# Patient Record
Sex: MALE | Race: OTHER | NOT HISPANIC OR LATINO | Employment: OTHER | URBAN - NONMETROPOLITAN AREA
[De-identification: names, ages, dates, MRNs, and addresses within clinical notes are randomized per-mention and may not be internally consistent; named-entity substitution may affect disease eponyms.]

---

## 2021-03-04 PROBLEM — Z95.0 PRESENCE OF CARDIAC PACEMAKER: Status: ACTIVE | Noted: 2021-03-04

## 2021-03-04 PROBLEM — I10 ESSENTIAL HYPERTENSION: Status: ACTIVE | Noted: 2021-03-04

## 2021-03-04 PROBLEM — I47.10 SVT (SUPRAVENTRICULAR TACHYCARDIA): Status: ACTIVE | Noted: 2021-03-04

## 2021-03-04 PROBLEM — I48.0 PAROXYSMAL A-FIB (HCC): Status: ACTIVE | Noted: 2021-03-04

## 2021-03-04 PROBLEM — I47.1 SVT (SUPRAVENTRICULAR TACHYCARDIA) (HCC): Status: ACTIVE | Noted: 2021-03-04

## 2021-03-04 PROBLEM — I49.5 SSS (SICK SINUS SYNDROME): Status: ACTIVE | Noted: 2021-03-04

## 2021-03-04 RX ORDER — LANOLIN ALCOHOL/MO/W.PET/CERES
1000 CREAM (GRAM) TOPICAL DAILY
COMMUNITY

## 2021-03-04 RX ORDER — ATORVASTATIN CALCIUM 10 MG/1
20 TABLET, FILM COATED ORAL DAILY
Status: ON HOLD | COMMUNITY
End: 2021-04-16

## 2021-03-04 RX ORDER — CYCLOSPORINE 0.5 MG/ML
1 EMULSION OPHTHALMIC 2 TIMES DAILY
COMMUNITY

## 2021-03-04 RX ORDER — NITROGLYCERIN 0.4 MG/1
0.4 TABLET SUBLINGUAL
COMMUNITY

## 2021-03-04 RX ORDER — MELATONIN
1000 DAILY
COMMUNITY

## 2021-03-04 RX ORDER — ZINC GLUCONATE 50 MG
50 TABLET ORAL DAILY
Status: ON HOLD | COMMUNITY
End: 2021-04-16

## 2021-03-04 RX ORDER — MULTIVIT-MIN/IRON/FOLIC ACID/K 18-600-40
2000 CAPSULE ORAL DAILY
COMMUNITY
End: 2021-07-02

## 2021-03-04 RX ORDER — IBUPROFEN 800 MG/1
800 TABLET ORAL EVERY 8 HOURS PRN
Status: ON HOLD | COMMUNITY
End: 2022-05-09

## 2021-03-04 RX ORDER — METOPROLOL SUCCINATE 50 MG/1
50 TABLET, EXTENDED RELEASE ORAL NIGHTLY
COMMUNITY
End: 2021-04-17 | Stop reason: HOSPADM

## 2021-03-04 RX ORDER — MULTIPLE VITAMINS W/ MINERALS TAB 9MG-400MCG
1 TAB ORAL DAILY
COMMUNITY

## 2021-03-04 RX ORDER — BENZALKONIUM CHLORIDE 1.3 MG/ML
CLOTH TOPICAL
COMMUNITY
End: 2022-04-06

## 2021-03-05 ENCOUNTER — CONSULT (OUTPATIENT)
Dept: CARDIOLOGY | Facility: CLINIC | Age: 76
End: 2021-03-05

## 2021-03-05 VITALS
HEIGHT: 65 IN | BODY MASS INDEX: 36.65 KG/M2 | WEIGHT: 220 LBS | DIASTOLIC BLOOD PRESSURE: 72 MMHG | SYSTOLIC BLOOD PRESSURE: 112 MMHG | HEART RATE: 80 BPM

## 2021-03-05 DIAGNOSIS — I49.5 SICK SINUS SYNDROME (HCC): ICD-10-CM

## 2021-03-05 DIAGNOSIS — I48.19 ATRIAL FIBRILLATION, PERSISTENT (HCC): Primary | ICD-10-CM

## 2021-03-05 DIAGNOSIS — I47.1 PAROXYSMAL SVT (SUPRAVENTRICULAR TACHYCARDIA) (HCC): ICD-10-CM

## 2021-03-05 PROCEDURE — 99204 OFFICE O/P NEW MOD 45 MIN: CPT | Performed by: INTERNAL MEDICINE

## 2021-03-05 NOTE — PROGRESS NOTES
Electrophysiology Clinic Consult     Benoit Pham  1945  [unfilled]  [unfilled]    03/05/21    DATE OF ADMISSION: (Not on file)  [facility] CARDIOLOGY    Jin Rahman MD  [address] [facility] [address] / [place] [address]  Referring Provider: Gabino Olvera     Chief Complaint   Patient presents with   • Atrial Fibrillation       Problem List:  1. Persistent Atrial Fibrillation   a. CHADSvasc = 2  b. Echocardiogram 7/19/2013: EF 60-65%, no significant valve abnormalities   c. LHC 11/11/2013: minimal luminal irregularities involving RCA and LAD, normal EF   d. Treated with beta blocker   2. Sick Sinus Syndrome  a. Dual Chamber PPM 7/2013  3. SVT  a. S/p RFA AVnRT in [place] in 2011  4. Tobacco use - resolved  5. DJD  6. GERD  7. Hiatal Hernia  8. TIANA - on CPAP  9. Asthma/COPD  10. Anxiety/Depression - with previous suicide attempt  11. BPH  12. DJD  13. Diverticulosis  14. Surgical History:  a. Tonsillectomy  b. Right total hip replacement     History of Present Illness:     75 year old WM with above stated past medical history, who presents today in consultation, referred by Dr. Avery Olvera for further evaluation and management of his atrial fibrillation. Over the past year, the patient has had increasing more frequent episodes of symptomatic atrial fibrillation, up to 60% burden on his last pacemaker interrogation in 2/2021. His symptoms include fatigue, worsening shortness of breath with exertion, lightheadedness, near-syncope, and poor exercise tolerance.  He is unaware of palpitations.  His symptoms are moderate to severe in intensity.  They occur constantly throughout the day and do not seem to be relieved by any special maneuvers.  His symptoms are definitely worsened with exertional activities.  He does not know if he is in atrial fibrillation today.  He has had no antiarrhythmic therapy or attempts for cardioversion recently.  Interrogations of his device demonstrated  that he has been in persistent atrial fibrillation for at least the past few months.  He has been on Xarelto for consider amount of times.  When he does rarely check his blood pressure at home, he states that they are reasonably well controlled.  His heart rates have generally been running in the 70 to 80 bpm range.  He denies any TIAs or CVAs.  Denies any obstructive sleep apnea symptoms although the patient is overweight.  He has had no significant bleeding on Xarelto.    Allergies   Allergen Reactions   • Penicillins Hives   • Nuts Mental Status Change     WALNUTS - BECOMES VERY SLEEPY        Cannot display prior to admission medications because the patient has not been admitted in this contact.            Current Outpatient Medications:   •  atorvastatin (LIPITOR) 10 MG tablet, Take 10 mg by mouth Daily., Disp: , Rfl:   •  Cholecalciferol (Vitamin D) 50 MCG (2000 UT) capsule, Take 2,000 Units by mouth Daily., Disp: , Rfl:   •  cholecalciferol (VITAMIN D3) 25 MCG (1000 UT) tablet, Take 1,000 Units by mouth Daily., Disp: , Rfl:   •  cycloSPORINE (RESTASIS) 0.05 % ophthalmic emulsion, Administer 1 drop to both eyes 2 (Two) Times a Day., Disp: , Rfl:   •  ibuprofen (ADVIL,MOTRIN) 800 MG tablet, Take 800 mg by mouth Every 8 (Eight) Hours As Needed., Disp: , Rfl:   •  Magnesium 100 MG tablet, Take 100 mg by mouth 3 (Three) Times a Day., Disp: , Rfl:   •  metoprolol succinate XL (TOPROL-XL) 50 MG 24 hr tablet, Take 50 mg by mouth Daily., Disp: , Rfl:   •  Misc Natural Products (GLUCOS-CHONDROIT-MSM COMPLEX PO), Take 1 tablet by mouth Daily., Disp: , Rfl:   •  multivitamin with minerals tablet tablet, Take 1 tablet by mouth Daily., Disp: , Rfl:   •  nitroglycerin (NITROSTAT) 0.4 MG SL tablet, Place 0.4 mg under the tongue Every 5 (Five) Minutes As Needed. Take no more than 3 doses in 15 minutes., Disp: , Rfl:   •  Respiratory Therapy Supplies (CareTouch CPAP & BIPAP Hose) misc, , Disp: , Rfl:   •  rivaroxaban (XARELTO)  "20 MG tablet, Take 20 mg by mouth Daily., Disp: , Rfl:   •  vitamin B-12 (CYANOCOBALAMIN) 1000 MCG tablet, Take 1,000 mcg by mouth Daily., Disp: , Rfl:   •  vitamin E 200 UNIT capsule, Take 200 Units by mouth Daily., Disp: , Rfl:   •  Zinc 50 MG tablet, Take 50 mg by mouth Daily., Disp: , Rfl:     Social History     Socioeconomic History   • Marital status: Single     Spouse name: Not on file   • Number of children: Not on file   • Years of education: Not on file   • Highest education level: Not on file     Family history: There is a history of hypertension and CAD although not premature.  No history of atrial fibrillation that he is aware of.    Social history: The patient is a retired  from [place].  He is single.  He denies any recreational drug use, tobacco abuse, or substantial alcohol use.  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      REVIEW OF SYSTEMS:   CONST:  No weight loss, fever, chills, weakness + fatigue.   HEENT:  No visual loss, blurred vision, double vision, yellow sclerae.                   No hearing loss, congestion, sore throat.   SKIN:      No rashes, urticaria, ulcers, sores.     RESP:     +shortness of breath, No hemoptysis, cough, sputum.   GI:           No anorexia, nausea, vomiting, diarrhea. + abdominal pain, No melena.   :         No burning on urination, hematuria or increased frequency.  ENDO:    No diaphoresis, cold or heat intolerance. No polyuria or polydipsia.   NEURO:  No headache, dizziness, syncope, paralysis, ataxia, or parasthesias.                  No change in bowel or bladder control. No history of CVA/TIA  MUSC:    No muscle, back pain, joint pain or stiffness.   HEME:    No anemia, bleeding, bruising. No history of DVT/PE.  PSYCH:  + history of depression, anxiety    Vitals:    03/05/21 1051   BP: 112/72   BP Location: Left arm   Patient Position: Sitting   Pulse: 80   Weight: 99.8 kg (220 lb)   Height: 165.1 cm (65\")           "       Physical Exam:  GEN: Well nourished, well-developed, no acute distress  HEENT: Normocephalic, atraumatic, PERRLA, moist mucous membranes  NECK: Supple, NO JVD, no thyromegaly, no lymphadenopathy  CARD: S1S2, RRR, no murmur, gallop, rub, PMI not appreciated  LUNGS: Clear to auscultation, normal respiratory effort  ABDOMEN: Soft, nontender, normal bowel sounds + obesity  EXTREMITIES: No gross deformities, no clubbing, cyanosis, or edema  SKIN: Warm, dry  NEURO: No focal deficits, alert and oriented x 3  PSYCHIATRIC: Normal affect and mood      I personally viewed and interpreted the patient's EKG/Telemetry/lab data    No results found for: GLUCOSE, CALCIUM, NA, K, CO2, CL, BUN, CREATININE, EGFRIFAFRI, EGFRIFNONA, BCR, ANIONGAP  No results found for: WBC, HGB, HCT, MCV, PLT  No results found for: INR, PROTIME  No results found for: TSH, S8GOMGX, Y6JJRBG, THYROIDAB          ECG 12 Lead    Date/Time: 3/5/2021 11:34 AM  Performed by: Varun Reyna MD  Authorized by: Varun Reyna MD   Comparison: not compared with previous ECG   Previous ECG: no previous ECG available  Rhythm: atrial fibrillation and paced  BPM: 80            Pacemaker interrogation demonstrates normal function.  He is in atrial fibrillation 100% of time since last interrogation.  Paced 70% in the ventricle.  No substantial RVR noted.  It looks like he has been in atrial fibrillation since December 20, 2020.        ICD-10-CM ICD-9-CM   1. Atrial fibrillation, persistent (CMS/[facility])  I48.19 427.31   2. Sick sinus syndrome (CMS/[facility])  I49.5 427.81   3. Paroxysmal SVT (supraventricular tachycardia) (CMS/[facility])  I47.1 427.0       Assessment and Plan:    1.  Symptomatic persistent atrial fibrillation with adequate rate control by interrogation of his pacemaker today.  At this point, options include external cardioversion with antiarrhythmic therapy versus pulmonary vein ablation.  After long discussion with the patient, he would like to pursue  external cardioversion antiarrhythmic therapy.  The patient will need an echocardiogram to assess LV size and function as well as left atrial size and to help determine which antiarrhythmic medication needs to be started.  He does understand all the risk and benefits of external cardioversion and conscious sedation as well as antiarrhythmic therapy.    2. Anticoagulation: Will Vasc score 2.  Continue Xarelto.    Thank you for the interesting consult.  Will notify you of the upcoming results of external cardioversion and antiarrhythmic therapy.

## 2021-03-08 ENCOUNTER — E-VISIT (OUTPATIENT)
Dept: FAMILY MEDICINE CLINIC | Facility: TELEHEALTH | Age: 76
End: 2021-03-08

## 2021-03-08 NOTE — PROGRESS NOTES
I counseled the patient to follow up with ED.  Patient is in Afib and has cardiologist that he is trying to message via [facility]. I will send secure chat message to the provider.

## 2021-03-10 ENCOUNTER — PREP FOR SURGERY (OUTPATIENT)
Dept: OTHER | Facility: HOSPITAL | Age: 76
End: 2021-03-10

## 2021-03-10 DIAGNOSIS — I48.19 ATRIAL FIBRILLATION, PERSISTENT (HCC): Primary | ICD-10-CM

## 2021-03-10 RX ORDER — SODIUM CHLORIDE 0.9 % (FLUSH) 0.9 %
10 SYRINGE (ML) INJECTION AS NEEDED
Status: CANCELLED | OUTPATIENT
Start: 2021-03-10

## 2021-03-10 RX ORDER — SODIUM CHLORIDE 0.9 % (FLUSH) 0.9 %
3 SYRINGE (ML) INJECTION EVERY 12 HOURS SCHEDULED
Status: CANCELLED | OUTPATIENT
Start: 2021-03-10

## 2021-03-10 RX ORDER — ACETAMINOPHEN 325 MG/1
650 TABLET ORAL EVERY 4 HOURS PRN
Status: CANCELLED | OUTPATIENT
Start: 2021-03-10

## 2021-03-10 RX ORDER — SODIUM CHLORIDE 9 MG/ML
1 INJECTION, SOLUTION INTRAVENOUS CONTINUOUS
Status: CANCELLED | OUTPATIENT
Start: 2021-03-10 | End: 2021-03-10

## 2021-03-16 ENCOUNTER — APPOINTMENT (OUTPATIENT)
Dept: CARDIOLOGY | Facility: HOSPITAL | Age: 76
End: 2021-03-16

## 2021-03-16 ENCOUNTER — HOSPITAL ENCOUNTER (OUTPATIENT)
Dept: CARDIOLOGY | Facility: HOSPITAL | Age: 76
Discharge: HOME | End: 2021-03-16
Attending: INTERNAL MEDICINE | Admitting: INTERNAL MEDICINE

## 2021-03-16 VITALS
TEMPERATURE: 97.2 F | OXYGEN SATURATION: 98 % | DIASTOLIC BLOOD PRESSURE: 82 MMHG | BODY MASS INDEX: 38.98 KG/M2 | WEIGHT: 220 LBS | HEIGHT: 63 IN | RESPIRATION RATE: 16 BRPM | SYSTOLIC BLOOD PRESSURE: 114 MMHG | HEART RATE: 75 BPM

## 2021-03-16 DIAGNOSIS — I48.0 PAROXYSMAL A-FIB (HCC): ICD-10-CM

## 2021-03-16 LAB
ANION GAP SERPL CALCULATED.3IONS-SCNC: 9 MMOL/L (ref 5–15)
BH CV ECHO MEAS - EDV(MOD-SP2): 85 ML
BH CV ECHO MEAS - EDV(MOD-SP4): 106 ML
BH CV ECHO MEAS - EF(MOD-BP): 42 %
BH CV ECHO MEAS - EF(MOD-SP2): 41.2 %
BH CV ECHO MEAS - EF(MOD-SP4): 46.2 %
BH CV ECHO MEAS - ESV(MOD-SP2): 50 ML
BH CV ECHO MEAS - ESV(MOD-SP4): 57 ML
BH CV ECHO MEAS - LVLD AP2: 6.9 CM
BH CV ECHO MEAS - LVLD AP4: 7.3 CM
BH CV ECHO MEAS - LVLS AP2: 6.2 CM
BH CV ECHO MEAS - LVLS AP4: 7.1 CM
BH CV ECHO MEAS - SV(MOD-SP2): 35 ML
BH CV ECHO MEAS - SV(MOD-SP4): 49 ML
BH CV VAS BP LEFT ARM: NORMAL MMHG
BUN SERPL-MCNC: 17 MG/DL (ref 8–23)
BUN/CREAT SERPL: 23.9 (ref 7–25)
CALCIUM SPEC-SCNC: 9.4 MG/DL (ref 8.6–10.5)
CHLORIDE SERPL-SCNC: 104 MMOL/L (ref 98–107)
CO2 SERPL-SCNC: 28 MMOL/L (ref 22–29)
CREAT SERPL-MCNC: 0.71 MG/DL (ref 0.76–1.27)
DEPRECATED RDW RBC AUTO: 46.8 FL (ref 37–54)
ERYTHROCYTE [DISTWIDTH] IN BLOOD BY AUTOMATED COUNT: 12.5 % (ref 12.3–15.4)
GFR SERPL CREATININE-BSD FRML MDRD: 108 ML/MIN/1.73
GFR SERPL CREATININE-BSD FRML MDRD: 131 ML/MIN/1.73
GLUCOSE SERPL-MCNC: 96 MG/DL (ref 65–99)
HBA1C MFR BLD: 4.7 % (ref 4.8–5.6)
HCT VFR BLD AUTO: 39.9 % (ref 37.5–51)
HGB BLD-MCNC: 13.8 G/DL (ref 13–17.7)
INR PPP: 1.81 (ref 0.85–1.16)
LV EF 2D ECHO EST: 42 %
MAXIMAL PREDICTED HEART RATE: 144 BPM
MCH RBC QN AUTO: 35.3 PG (ref 26.6–33)
MCHC RBC AUTO-ENTMCNC: 34.6 G/DL (ref 31.5–35.7)
MCV RBC AUTO: 102 FL (ref 79–97)
PLATELET # BLD AUTO: 235 10*3/MM3 (ref 140–450)
PMV BLD AUTO: 10 FL (ref 6–12)
POTASSIUM SERPL-SCNC: 4.7 MMOL/L (ref 3.5–5.2)
PROTHROMBIN TIME: 20.6 SECONDS (ref 11.5–14)
RBC # BLD AUTO: 3.91 10*6/MM3 (ref 4.14–5.8)
SODIUM SERPL-SCNC: 141 MMOL/L (ref 136–145)
STRESS TARGET HR: 122 BPM
WBC # BLD AUTO: 6.99 10*3/MM3 (ref 3.4–10.8)

## 2021-03-16 PROCEDURE — 80048 BASIC METABOLIC PNL TOTAL CA: CPT | Performed by: INTERNAL MEDICINE

## 2021-03-16 PROCEDURE — 93308 TTE F-UP OR LMTD: CPT

## 2021-03-16 PROCEDURE — 25010000002 MIDAZOLAM PER 1 MG: Performed by: INTERNAL MEDICINE

## 2021-03-16 PROCEDURE — 93308 TTE F-UP OR LMTD: CPT | Performed by: INTERNAL MEDICINE

## 2021-03-16 PROCEDURE — 93321 DOPPLER ECHO F-UP/LMTD STD: CPT

## 2021-03-16 PROCEDURE — 85027 COMPLETE CBC AUTOMATED: CPT | Performed by: INTERNAL MEDICINE

## 2021-03-16 PROCEDURE — 93321 DOPPLER ECHO F-UP/LMTD STD: CPT | Performed by: INTERNAL MEDICINE

## 2021-03-16 PROCEDURE — 92960 CARDIOVERSION ELECTRIC EXT: CPT

## 2021-03-16 PROCEDURE — 93325 DOPPLER ECHO COLOR FLOW MAPG: CPT

## 2021-03-16 PROCEDURE — 83036 HEMOGLOBIN GLYCOSYLATED A1C: CPT | Performed by: PHYSICIAN ASSISTANT

## 2021-03-16 PROCEDURE — 93325 DOPPLER ECHO COLOR FLOW MAPG: CPT | Performed by: INTERNAL MEDICINE

## 2021-03-16 PROCEDURE — 92960 CARDIOVERSION ELECTRIC EXT: CPT | Performed by: INTERNAL MEDICINE

## 2021-03-16 PROCEDURE — 85610 PROTHROMBIN TIME: CPT | Performed by: INTERNAL MEDICINE

## 2021-03-16 RX ORDER — SODIUM CHLORIDE 9 MG/ML
1 INJECTION, SOLUTION INTRAVENOUS CONTINUOUS
Status: ACTIVE | OUTPATIENT
Start: 2021-03-16 | End: 2021-03-16

## 2021-03-16 RX ORDER — SODIUM CHLORIDE 0.9 % (FLUSH) 0.9 %
3 SYRINGE (ML) INJECTION EVERY 12 HOURS SCHEDULED
Status: DISCONTINUED | OUTPATIENT
Start: 2021-03-16 | End: 2021-03-16 | Stop reason: HOSPADM

## 2021-03-16 RX ORDER — MIDAZOLAM HYDROCHLORIDE 1 MG/ML
INJECTION INTRAMUSCULAR; INTRAVENOUS
Status: COMPLETED | OUTPATIENT
Start: 2021-03-16 | End: 2021-03-16

## 2021-03-16 RX ORDER — SODIUM PHOSPHATE,MONO-DIBASIC 19G-7G/118
1 ENEMA (ML) RECTAL 2 TIMES DAILY WITH MEALS
COMMUNITY

## 2021-03-16 RX ORDER — NALOXONE HYDROCHLORIDE 0.4 MG/ML
INJECTION, SOLUTION INTRAMUSCULAR; INTRAVENOUS; SUBCUTANEOUS
Status: DISCONTINUED
Start: 2021-03-16 | End: 2021-03-16 | Stop reason: WASHOUT

## 2021-03-16 RX ORDER — MIDAZOLAM HYDROCHLORIDE 1 MG/ML
INJECTION INTRAMUSCULAR; INTRAVENOUS
Status: DISCONTINUED
Start: 2021-03-16 | End: 2021-03-16 | Stop reason: HOSPADM

## 2021-03-16 RX ORDER — ACETAMINOPHEN 325 MG/1
650 TABLET ORAL EVERY 4 HOURS PRN
Status: DISCONTINUED | OUTPATIENT
Start: 2021-03-16 | End: 2021-03-16 | Stop reason: HOSPADM

## 2021-03-16 RX ORDER — SODIUM CHLORIDE 0.9 % (FLUSH) 0.9 %
10 SYRINGE (ML) INJECTION AS NEEDED
Status: DISCONTINUED | OUTPATIENT
Start: 2021-03-16 | End: 2021-03-16 | Stop reason: HOSPADM

## 2021-03-16 RX ORDER — FLUMAZENIL 0.1 MG/ML
INJECTION INTRAVENOUS
Status: DISCONTINUED
Start: 2021-03-16 | End: 2021-03-16 | Stop reason: WASHOUT

## 2021-03-16 RX ADMIN — METHOHEXITAL SODIUM 10 MG: 500 INJECTION, POWDER, LYOPHILIZED, FOR SOLUTION INTRAMUSCULAR; INTRAVENOUS; RECTAL at 12:17

## 2021-03-16 RX ADMIN — METHOHEXITAL SODIUM 10 MG: 500 INJECTION, POWDER, LYOPHILIZED, FOR SOLUTION INTRAMUSCULAR; INTRAVENOUS; RECTAL at 12:18

## 2021-03-16 RX ADMIN — METHOHEXITAL SODIUM 20 MG: 500 INJECTION, POWDER, LYOPHILIZED, FOR SOLUTION INTRAMUSCULAR; INTRAVENOUS; RECTAL at 12:16

## 2021-03-16 RX ADMIN — MIDAZOLAM 1 MG: 1 INJECTION INTRAMUSCULAR; INTRAVENOUS at 12:16

## 2021-03-16 NOTE — H&P
Cardiology H&P     Benoit Pham  1945  [phone]  There is no work phone number on file.    03/16/21    DATE OF ADMISSION: 3/16/2021  [facility] CECI Rahman, Jin Daniel MD  [address] [address] / [place] [address]    Referring: Dr Olvera   CC:  Afib    Problem List:  Persistent Atrial Fibrillation   CHADSvasc = 2  Echocardiogram 7/19/2013: EF 60-65%, no significant valve abnormalities   LHC 11/11/2013: minimal luminal irregularities involving RCA and LAD, normal EF   Treated with beta blocker   Sick Sinus Syndrome  Dual Chamber [employer]  PPM 7/2013  SVT  S/p RFA AVnRT in [place] in 2011  Tobacco use - resolved  DJD  GERD  Hiatal Hernia  TIANA - on CPAP  Asthma/COPD  Anxiety/Depression - with previous suicide attempt  BPH  DJD  Diverticulosis  Surgical History:  Tonsillectomy  Right total hip replacement      History of Present Illness: Mr. Huertas is a pleasant 75-year-old white male referred by Dr. Avery Olvera for further evaluation and management of persistent, symptomatic atrial fibrillation and [employer] pacemaker.  He had an increasing atrial fibrillation burden documented on recent pacemaker interrogations.  He has symptoms of atrial fibrillation including fatigue, worsening shortness of breath with exertion, lightheadedness, near syncope.  He states the symptoms are moderate to severe in intensity.  They occur every on a daily basis.  There are no specific alleviating factors he has been able to identify.  He is not able to complete all the daily tasks and exercise that he let would like to.  He has never undergone cardioversion or taking antiarrhythmic therapy in the past.  Prior treatment for A. fib included pacemaker placement and management with beta-blockers.  he has taken Xarelto with no bleeding problems.  No missed doses.  No recent hospitalizations, emergency room visits, medication changes or health events except since he saw Dr. Reyna in clinic  3/5/2021.       Allergies   Allergen Reactions    Penicillins Hives    Nuts Mental Status Change     WALNUTS - BECOMES VERY SLEEPY       Prior to Admission Medications       Prescriptions Last Dose Informant Patient Reported? Taking?    atorvastatin (LIPITOR) 10 MG tablet   Yes No    Take 10 mg by mouth Daily.    Cholecalciferol (Vitamin D) 50 MCG (2000 UT) capsule   Yes No    Take 2,000 Units by mouth Daily.    cholecalciferol (VITAMIN D3) 25 MCG (1000 UT) tablet   Yes No    Take 1,000 Units by mouth Daily.    cycloSPORINE (RESTASIS) 0.05 % ophthalmic emulsion   Yes No    Administer 1 drop to both eyes 2 (Two) Times a Day.    ibuprofen (ADVIL,MOTRIN) 800 MG tablet   Yes No    Take 800 mg by mouth Every 8 (Eight) Hours As Needed.    Magnesium 100 MG tablet   Yes No    Take 100 mg by mouth 3 (Three) Times a Day.    metoprolol succinate XL (TOPROL-XL) 50 MG 24 hr tablet   Yes No    Take 50 mg by mouth Daily.    Misc Natural Products (GLUCOS-CHONDROIT-MSM COMPLEX PO)   Yes No    Take 1 tablet by mouth Daily.    multivitamin with minerals tablet tablet   Yes No    Take 1 tablet by mouth Daily.    nitroglycerin (NITROSTAT) 0.4 MG SL tablet   Yes No    Place 0.4 mg under the tongue Every 5 (Five) Minutes As Needed. Take no more than 3 doses in 15 minutes.    Respiratory Therapy Supplies (CareTouch CPAP & BIPAP Hose) misc   Yes No    rivaroxaban (XARELTO) 20 MG tablet   Yes No    Take 20 mg by mouth Daily.    vitamin B-12 (CYANOCOBALAMIN) 1000 MCG tablet   Yes No    Take 1,000 mcg by mouth Daily.    vitamin E 200 UNIT capsule   Yes No    Take 200 Units by mouth Daily.    Zinc 50 MG tablet   Yes No    Take 50 mg by mouth Daily.              Current Facility-Administered Medications:     acetaminophen (TYLENOL) tablet 650 mg, 650 mg, Oral, Q4H PRN, Gabrielle Mustafa PA    methohexital (BREVITAL) 50 MG/5ML injection solution prefilled syringe  - ADS Override Pull, , , ,     midazolam (VERSED) 2 MG/2ML injection  - ADS Override  Pull, , , ,     sodium chloride 0.9 % flush 10 mL, 10 mL, Intravenous, PRN, Gabrielle Mustafa PA    sodium chloride 0.9 % flush 3 mL, 3 mL, Intravenous, Q12H, Gabrielle Mustafa PA    Current Outpatient Medications:     atorvastatin (LIPITOR) 10 MG tablet, Take 10 mg by mouth Daily., Disp: , Rfl:     cholecalciferol (VITAMIN D3) 25 MCG (1000 UT) tablet, Take 1,000 Units by mouth Daily., Disp: , Rfl:     cycloSPORINE (RESTASIS) 0.05 % ophthalmic emulsion, Administer 1 drop to both eyes 2 (Two) Times a Day., Disp: , Rfl:     glucosamine-chondroitin 500-400 MG capsule capsule, Take  by mouth 3 (Three) Times a Day With Meals., Disp: , Rfl:     ibuprofen (ADVIL,MOTRIN) 800 MG tablet, Take 800 mg by mouth Every 8 (Eight) Hours As Needed., Disp: , Rfl:     Magnesium 100 MG tablet, Take 100 mg by mouth 3 (Three) Times a Day., Disp: , Rfl:     metoprolol succinate XL (TOPROL-XL) 50 MG 24 hr tablet, Take 50 mg by mouth Daily., Disp: , Rfl:     multivitamin with minerals tablet tablet, Take 1 tablet by mouth Daily., Disp: , Rfl:     nitroglycerin (NITROSTAT) 0.4 MG SL tablet, Place 0.4 mg under the tongue Every 5 (Five) Minutes As Needed. Take no more than 3 doses in 15 minutes., Disp: , Rfl:     rivaroxaban (XARELTO) 20 MG tablet, Take 20 mg by mouth Daily., Disp: , Rfl:     vitamin B-12 (CYANOCOBALAMIN) 1000 MCG tablet, Take 1,000 mcg by mouth Daily., Disp: , Rfl:     vitamin E 200 UNIT capsule, Take 200 Units by mouth Daily., Disp: , Rfl:     Zinc 50 MG tablet, Take 50 mg by mouth Daily., Disp: , Rfl:     Cholecalciferol (Vitamin D) 50 MCG (2000 UT) capsule, Take 2,000 Units by mouth Daily., Disp: , Rfl:     Misc Natural Products (GLUCOS-CHONDROIT-MSM COMPLEX PO), Take 1 tablet by mouth Daily., Disp: , Rfl:     Respiratory Therapy Supplies (CareTouch CPAP & BIPAP Hose) misc, , Disp: , Rfl:     Social History     Socioeconomic History    Marital status: Single     Spouse name: Not on file    Number of children: Not on file     "Years of education: Not on file    Highest education level: Not on file       No family history on file.    REVIEW OF SYSTEMS:   CONST:  No weight loss, fever, chills, +weakness or +fatigue.   HEENT:  No visual loss, blurred vision, double vision, yellow sclerae.                   No hearing loss, congestion, sore throat.   SKIN:      No rashes, urticaria, ulcers, sores.     RESP:     + shortness of breath, hemoptysis, cough, sputum.   GI:           No anorexia, nausea, vomiting, diarrhea. No abdominal pain, melena.   :         No burning on urination, hematuria or increased frequency.  ENDO:    No diaphoresis, cold or heat intolerance. No polyuria or polydipsia.   NEURO:  No headache, dizziness, syncope, paralysis, ataxia, or parasthesias.                  No change in bowel or bladder control. No history of CVA/TIA  MUSC:    No muscle, back pain, joint pain or stiffness.   HEME:    No anemia, bleeding, bruising. No history of DVT/PE.  PSYCH:  No history of depression, anxiety    Vitals:    03/16/21 1137 03/16/21 1142 03/16/21 1322   BP: 135/85 114/82    BP Location: Right arm Left arm    Patient Position: Lying Lying    Pulse: 75     Resp: 16     Temp: 97.2 °F (36.2 °C)     TempSrc: Tympanic     SpO2: 98%     Weight: 100 kg (220 lb 14.4 oz)  99.8 kg (220 lb)   Height: 165.1 cm (65\")  160 cm (62.99\")         Vital Sign Min/Max for last 24 hours  Temp  Min: 97.2 °F (36.2 °C)  Max: 97.2 °F (36.2 °C)   BP  Min: 114/82  Max: 135/85   Pulse  Min: 75  Max: 75   Resp  Min: 16  Max: 16   SpO2  Min: 98 %  Max: 98 %   Flow (L/min)  Min: 2  Max: 2    No intake or output data in the 24 hours ending 03/16/21 1643          Physical Exam:  GEN: Well nourished, Well- developed  No acute distress  HEENT: Normocephalic, Atraumatic, PERRLA, moist mucous membranes  NECK: supple, NO JVD, no thyromegaly, no lymphadenopathy  CARD: Irregular no murmur, gallop, rub  LUNGS: Clear to auscultataion, normal respiratory effort  ABDOMEN: Soft, " nontender, normal bowel sounds- large abdominal girth   EXTREMITIES:No gross deformities,  No clubbing, cyanosis, or edema  SKIN: Warm, dry, left chest pacemaker site noted  NEURO: No focal deficits  PSYCHIATRIC: Normal affect and mood      I personally viewed and interpreted the patient's EKG/Telemetry/lab data    Data:   Labs: Pending   Results from last 7 days   Lab Units 03/16/21  1138   WBC 10*3/mm3 6.99   HEMOGLOBIN g/dL 13.8   HEMATOCRIT % 39.9   PLATELETS 10*3/mm3 235     Results from last 7 days   Lab Units 03/16/21  1138   SODIUM mmol/L 141   POTASSIUM mmol/L 4.7   CHLORIDE mmol/L 104   CO2 mmol/L 28.0   BUN mg/dL 17   CREATININE mg/dL 0.71*   GLUCOSE mg/dL 96      Results from last 7 days   Lab Units 03/16/21  1138   HEMOGLOBIN A1C % 4.70*             Results from last 7 days   Lab Units 03/16/21  1138   PROTIME Seconds 20.6*   INR  1.81*                 No intake or output data in the 24 hours ending 03/16/21 1643    Chest X-Ray:  Imaging Results (Last 24 Hours)       ** No results found for the last 24 hours. **          COVID Test: Result report from outside facility reviewed: COVID test is negative.  Please see scanned document under media    Telemetry: Atrial fibrillation 75-85bpm     Assessment and Plan:   1) Symptomatic persistent atrial fibrillation/tachycardia-bradycardia syndrome  -Status post [employer] dual-chamber pacemaker  -Patient presents today for external cardioversion.  The risk, benefits, alternatives to external cardioversion have been reviewed with the patient who wishes to proceed  -We will proceed with echocardiogram following cardioversion to reassess LV size and function as well as left atrial size to determine which antiarrhythmic medication needs to be started.    2) Anticoagulation: [id number] score of 2-continue Xarelto, no missed doses    Electronically signed by ALBINO Roman, 03/16/21, 11:27 AM EDT.      I, Varun Reyna MD, personally performed the  services face to face as described and documented by the above named individual. I have made any necessary edits and it is both accurate and complete 3/16/2021  16:43 EDT          Addendum:   Pt had ERAF and continues to be paced 75-85 bpm in Afib. Limited Echo shows EF 42%. Dr. Reyna had a long discussion with the patient and all agree to proceed with PVA in the near future. We will make every effort to arrange the schedule so that he can get in in the next month. He will continue current home meds including Xarelto and Metoprolol.

## 2021-03-25 ENCOUNTER — PREP FOR SURGERY (OUTPATIENT)
Dept: OTHER | Facility: HOSPITAL | Age: 76
End: 2021-03-25

## 2021-03-25 DIAGNOSIS — I48.0 PAROXYSMAL A-FIB (HCC): Primary | ICD-10-CM

## 2021-03-25 RX ORDER — SODIUM CHLORIDE 9 MG/ML
1 INJECTION, SOLUTION INTRAVENOUS CONTINUOUS
Status: CANCELLED | OUTPATIENT
Start: 2021-03-25 | End: 2021-03-25

## 2021-03-25 RX ORDER — SODIUM CHLORIDE 0.9 % (FLUSH) 0.9 %
10 SYRINGE (ML) INJECTION AS NEEDED
Status: CANCELLED | OUTPATIENT
Start: 2021-03-25

## 2021-03-25 RX ORDER — ACETAMINOPHEN 325 MG/1
650 TABLET ORAL EVERY 4 HOURS PRN
Status: CANCELLED | OUTPATIENT
Start: 2021-03-25

## 2021-03-25 RX ORDER — SODIUM CHLORIDE 0.9 % (FLUSH) 0.9 %
3 SYRINGE (ML) INJECTION EVERY 12 HOURS SCHEDULED
Status: CANCELLED | OUTPATIENT
Start: 2021-03-25

## 2021-03-29 ENCOUNTER — APPOINTMENT (OUTPATIENT)
Dept: PREADMISSION TESTING | Facility: HOSPITAL | Age: 76
End: 2021-03-29

## 2021-03-29 ENCOUNTER — HOSPITAL ENCOUNTER (OUTPATIENT)
Dept: CT IMAGING | Facility: HOSPITAL | Age: 76
Discharge: HOME | End: 2021-03-29

## 2021-03-29 DIAGNOSIS — I48.0 PAROXYSMAL A-FIB (HCC): ICD-10-CM

## 2021-03-29 DIAGNOSIS — E66.01 SEVERE OBESITY (BMI 35.0-39.9) WITH COMORBIDITY (HCC): Primary | ICD-10-CM

## 2021-03-29 DIAGNOSIS — I48.19 ATRIAL FIBRILLATION, PERSISTENT (HCC): Primary | ICD-10-CM

## 2021-03-29 LAB
ANION GAP SERPL CALCULATED.3IONS-SCNC: 9 MMOL/L (ref 5–15)
BUN SERPL-MCNC: 22 MG/DL (ref 8–23)
BUN/CREAT SERPL: 32.8 (ref 7–25)
CALCIUM SPEC-SCNC: 8.7 MG/DL (ref 8.6–10.5)
CHLORIDE SERPL-SCNC: 105 MMOL/L (ref 98–107)
CO2 SERPL-SCNC: 27 MMOL/L (ref 22–29)
CREAT SERPL-MCNC: 0.67 MG/DL (ref 0.76–1.27)
DEPRECATED RDW RBC AUTO: 47.4 FL (ref 37–54)
ERYTHROCYTE [DISTWIDTH] IN BLOOD BY AUTOMATED COUNT: 12.5 % (ref 12.3–15.4)
GFR SERPL CREATININE-BSD FRML MDRD: 115 ML/MIN/1.73
GFR SERPL CREATININE-BSD FRML MDRD: 140 ML/MIN/1.73
GLUCOSE SERPL-MCNC: 86 MG/DL (ref 65–99)
HCT VFR BLD AUTO: 40.5 % (ref 37.5–51)
HGB BLD-MCNC: 13.8 G/DL (ref 13–17.7)
MCH RBC QN AUTO: 35.3 PG (ref 26.6–33)
MCHC RBC AUTO-ENTMCNC: 34.1 G/DL (ref 31.5–35.7)
MCV RBC AUTO: 103.6 FL (ref 79–97)
PLATELET # BLD AUTO: 229 10*3/MM3 (ref 140–450)
PMV BLD AUTO: 10.4 FL (ref 6–12)
POTASSIUM SERPL-SCNC: 5 MMOL/L (ref 3.5–5.2)
RBC # BLD AUTO: 3.91 10*6/MM3 (ref 4.14–5.8)
SARS-COV-2 RNA RESP QL NAA+PROBE: DETECTED
SARS-COV-2 RNA RESP QL NAA+PROBE: DETECTED
SODIUM SERPL-SCNC: 141 MMOL/L (ref 136–145)
TSH SERPL DL<=0.05 MIU/L-ACNC: 3.69 UIU/ML (ref 0.27–4.2)
WBC # BLD AUTO: 7.05 10*3/MM3 (ref 3.4–10.8)

## 2021-03-29 PROCEDURE — 36415 COLL VENOUS BLD VENIPUNCTURE: CPT

## 2021-03-29 PROCEDURE — C9803 HOPD COVID-19 SPEC COLLECT: HCPCS

## 2021-03-29 PROCEDURE — 85027 COMPLETE CBC AUTOMATED: CPT

## 2021-03-29 PROCEDURE — U0003 INFECTIOUS AGENT DETECTION BY NUCLEIC ACID (DNA OR RNA); SEVERE ACUTE RESPIRATORY SYNDROME CORONAVIRUS 2 (SARS-COV-2) (CORONAVIRUS DISEASE [COVID-19]), AMPLIFIED PROBE TECHNIQUE, MAKING USE OF HIGH THROUGHPUT TECHNOLOGIES AS DESCRIBED BY CMS-2020-01-R: HCPCS

## 2021-03-29 PROCEDURE — 80048 BASIC METABOLIC PNL TOTAL CA: CPT

## 2021-03-29 PROCEDURE — 84443 ASSAY THYROID STIM HORMONE: CPT

## 2021-03-29 RX ORDER — ACETAMINOPHEN 500 MG
500 TABLET ORAL EVERY 6 HOURS PRN
COMMUNITY

## 2021-03-29 RX ORDER — MULTIVIT-MIN/IRON/FOLIC ACID/K 18-600-40
1 CAPSULE ORAL
Status: ON HOLD | COMMUNITY
End: 2021-04-16

## 2021-04-02 ENCOUNTER — APPOINTMENT (OUTPATIENT)
Dept: PREADMISSION TESTING | Facility: HOSPITAL | Age: 76
End: 2021-04-02

## 2021-04-02 ENCOUNTER — HOSPITAL ENCOUNTER (OUTPATIENT)
Dept: CT IMAGING | Facility: HOSPITAL | Age: 76
End: 2021-04-02

## 2021-04-14 ENCOUNTER — APPOINTMENT (OUTPATIENT)
Dept: PREADMISSION TESTING | Facility: HOSPITAL | Age: 76
End: 2021-04-14

## 2021-04-14 ENCOUNTER — HOSPITAL ENCOUNTER (OUTPATIENT)
Dept: CT IMAGING | Facility: HOSPITAL | Age: 76
Discharge: HOME | End: 2021-04-14

## 2021-04-14 ENCOUNTER — PRE-ADMISSION TESTING (OUTPATIENT)
Dept: PREADMISSION TESTING | Facility: HOSPITAL | Age: 76
End: 2021-04-14

## 2021-04-14 LAB — SARS-COV-2 RNA PNL SPEC NAA+PROBE: NOT DETECTED

## 2021-04-14 PROCEDURE — U0004 COV-19 TEST NON-CDC HGH THRU: HCPCS

## 2021-04-14 PROCEDURE — C9803 HOPD COVID-19 SPEC COLLECT: HCPCS

## 2021-04-14 PROCEDURE — 0 IOPAMIDOL PER 1 ML: Performed by: NURSE PRACTITIONER

## 2021-04-14 PROCEDURE — 71275 CT ANGIOGRAPHY CHEST: CPT

## 2021-04-14 RX ADMIN — IOPAMIDOL 85 ML: 755 INJECTION, SOLUTION INTRAVENOUS at 11:40

## 2021-04-14 NOTE — PAT
Patient presented to covid clinic for covid test.  Patient was positive on 3/29 x two.  Has not been 28 days since positive and repeat test would no be performed due to not greater than 90 days.  Patients PAT appointment was cancelled due to positive covid. Notified CT because patient also has appt today for CT scan.  Called ADORE at Dr. Reyna's office but no answer, left message for Brenda at Dr. Reyna's office to call back regarding patients procedure.

## 2021-04-15 ENCOUNTER — ANESTHESIA EVENT (OUTPATIENT)
Dept: CARDIOLOGY | Facility: HOSPITAL | Age: 76
End: 2021-04-15

## 2021-04-15 ENCOUNTER — PREP FOR SURGERY (OUTPATIENT)
Dept: OTHER | Facility: HOSPITAL | Age: 76
End: 2021-04-15

## 2021-04-15 NOTE — ANESTHESIA PREPROCEDURE EVALUATION
Anesthesia Evaluation                  Airway   Mallampati: II  TM distance: >3 FB  Neck ROM: full  No difficulty expected  Dental - normal exam     Pulmonary - normal exam   (+) a smoker Former, COPD, asthma,sleep apnea on CPAP,   Cardiovascular - normal exam    (+) pacemaker pacemaker, hypertension, dysrhythmias (xarelto tx; last dose 2.5 days ago) Paroxysmal Atrial Fib, hyperlipidemia,     ROS comment:   CT angio chest 4/14/21: no DELMI thrombus    Echo 3/21: EF 42%, mild AR/MR/TR    Neuro/Psych  GI/Hepatic/Renal/Endo    (+)  GERD,      Musculoskeletal     Abdominal  - normal exam    Bowel sounds: normal.   Substance History      OB/GYN          Other   arthritis,                    Anesthesia Plan    ASA 3     general     intravenous induction     Anesthetic plan, all risks, benefits, and alternatives have been provided, discussed and informed consent has been obtained with: patient.    Plan discussed with CRNA.

## 2021-04-16 ENCOUNTER — HOSPITAL ENCOUNTER (OUTPATIENT)
Facility: HOSPITAL | Age: 76
Discharge: HOME | End: 2021-04-17
Attending: INTERNAL MEDICINE | Admitting: INTERNAL MEDICINE

## 2021-04-16 ENCOUNTER — ANESTHESIA (OUTPATIENT)
Dept: CARDIOLOGY | Facility: HOSPITAL | Age: 76
End: 2021-04-16

## 2021-04-16 DIAGNOSIS — I49.5 SSS (SICK SINUS SYNDROME) (HCC): Primary | ICD-10-CM

## 2021-04-16 DIAGNOSIS — I48.0 PAROXYSMAL A-FIB (HCC): ICD-10-CM

## 2021-04-16 LAB
ACT BLD: 120 SECONDS (ref 82–152)
ACT BLD: 296 SECONDS (ref 82–152)
ACT BLD: 312 SECONDS (ref 82–152)
ACT BLD: 329 SECONDS (ref 82–152)
ACT BLD: 340 SECONDS (ref 82–152)
ACT BLD: <50 SECONDS (ref 82–152)
ANION GAP SERPL CALCULATED.3IONS-SCNC: 8 MMOL/L (ref 5–15)
BUN SERPL-MCNC: 14 MG/DL (ref 8–23)
BUN/CREAT SERPL: 20 (ref 7–25)
CALCIUM SPEC-SCNC: 8.2 MG/DL (ref 8.6–10.5)
CHLORIDE SERPL-SCNC: 107 MMOL/L (ref 98–107)
CO2 SERPL-SCNC: 26 MMOL/L (ref 22–29)
CREAT SERPL-MCNC: 0.7 MG/DL (ref 0.76–1.27)
DEPRECATED RDW RBC AUTO: 45.1 FL (ref 37–54)
ERYTHROCYTE [DISTWIDTH] IN BLOOD BY AUTOMATED COUNT: 12.3 % (ref 12.3–15.4)
GFR SERPL CREATININE-BSD FRML MDRD: 110 ML/MIN/1.73
GFR SERPL CREATININE-BSD FRML MDRD: 133 ML/MIN/1.73
GLUCOSE SERPL-MCNC: 91 MG/DL (ref 65–99)
HBA1C MFR BLD: 4.7 % (ref 4.8–5.6)
HCT VFR BLD AUTO: 37.5 % (ref 37.5–51)
HGB BLD-MCNC: 13.2 G/DL (ref 13–17.7)
MCH RBC QN AUTO: 35 PG (ref 26.6–33)
MCHC RBC AUTO-ENTMCNC: 35.2 G/DL (ref 31.5–35.7)
MCV RBC AUTO: 99.5 FL (ref 79–97)
PLATELET # BLD AUTO: 213 10*3/MM3 (ref 140–450)
PMV BLD AUTO: 10.3 FL (ref 6–12)
POTASSIUM SERPL-SCNC: 4.4 MMOL/L (ref 3.5–5.2)
RBC # BLD AUTO: 3.77 10*6/MM3 (ref 4.14–5.8)
SODIUM SERPL-SCNC: 141 MMOL/L (ref 136–145)
WBC # BLD AUTO: 7.03 10*3/MM3 (ref 3.4–10.8)

## 2021-04-16 PROCEDURE — A9270 NON-COVERED ITEM OR SERVICE: HCPCS | Performed by: INTERNAL MEDICINE

## 2021-04-16 PROCEDURE — 93623 PRGRMD STIMJ&PACG IV RX NFS: CPT | Performed by: INTERNAL MEDICINE

## 2021-04-16 PROCEDURE — 93005 ELECTROCARDIOGRAM TRACING: CPT | Performed by: INTERNAL MEDICINE

## 2021-04-16 PROCEDURE — 25010000002 VANCOMYCIN: Performed by: PHYSICIAN ASSISTANT

## 2021-04-16 PROCEDURE — C1894 INTRO/SHEATH, NON-LASER: HCPCS | Performed by: INTERNAL MEDICINE

## 2021-04-16 PROCEDURE — 93662 INTRACARDIAC ECG (ICE): CPT | Performed by: INTERNAL MEDICINE

## 2021-04-16 PROCEDURE — 25010000002 PROPOFOL 10 MG/ML EMULSION: Performed by: NURSE ANESTHETIST, CERTIFIED REGISTERED

## 2021-04-16 PROCEDURE — 0 DIATRIZOATE MEGLUMINE & SODIUM PER 1 ML: Performed by: INTERNAL MEDICINE

## 2021-04-16 PROCEDURE — 25010000002 NEOSTIGMINE 10 MG/10ML SOLUTION: Performed by: NURSE ANESTHETIST, CERTIFIED REGISTERED

## 2021-04-16 PROCEDURE — 80048 BASIC METABOLIC PNL TOTAL CA: CPT | Performed by: INTERNAL MEDICINE

## 2021-04-16 PROCEDURE — 63710000001 SUCRALFATE 1 G TABLET: Performed by: INTERNAL MEDICINE

## 2021-04-16 PROCEDURE — 25010000002 ONDANSETRON PER 1 MG: Performed by: NURSE ANESTHETIST, CERTIFIED REGISTERED

## 2021-04-16 PROCEDURE — 25010000002 PROTAMINE SULFATE PER 10 MG: Performed by: INTERNAL MEDICINE

## 2021-04-16 PROCEDURE — 25010000002 PHENYLEPHRINE 10 MG/ML SOLUTION 1 ML VIAL: Performed by: NURSE ANESTHETIST, CERTIFIED REGISTERED

## 2021-04-16 PROCEDURE — C1893 INTRO/SHEATH, FIXED,NON-PEEL: HCPCS | Performed by: INTERNAL MEDICINE

## 2021-04-16 PROCEDURE — C1759 CATH, INTRA ECHOCARDIOGRAPHY: HCPCS | Performed by: INTERNAL MEDICINE

## 2021-04-16 PROCEDURE — 93656 COMPRE EP EVAL ABLTJ ATR FIB: CPT | Performed by: INTERNAL MEDICINE

## 2021-04-16 PROCEDURE — 93655 ICAR CATH ABLTJ DSCRT ARRHYT: CPT | Performed by: INTERNAL MEDICINE

## 2021-04-16 PROCEDURE — 85027 COMPLETE CBC AUTOMATED: CPT | Performed by: INTERNAL MEDICINE

## 2021-04-16 PROCEDURE — C1732 CATH, EP, DIAG/ABL, 3D/VECT: HCPCS | Performed by: INTERNAL MEDICINE

## 2021-04-16 PROCEDURE — 25010000003 LIDOCAINE 1 % SOLUTION: Performed by: INTERNAL MEDICINE

## 2021-04-16 PROCEDURE — 25010000002 FUROSEMIDE PER 20 MG: Performed by: INTERNAL MEDICINE

## 2021-04-16 PROCEDURE — 25010000002 HEPARIN (PORCINE) PER 1000 UNITS: Performed by: INTERNAL MEDICINE

## 2021-04-16 PROCEDURE — 93613 INTRACARDIAC EPHYS 3D MAPG: CPT | Performed by: INTERNAL MEDICINE

## 2021-04-16 PROCEDURE — C1766 INTRO/SHEATH,STRBLE,NON-PEEL: HCPCS | Performed by: INTERNAL MEDICINE

## 2021-04-16 PROCEDURE — C1730 CATH, EP, 19 OR FEW ELECT: HCPCS | Performed by: INTERNAL MEDICINE

## 2021-04-16 PROCEDURE — 25010000002 FENTANYL CITRATE (PF) 100 MCG/2ML SOLUTION: Performed by: NURSE ANESTHETIST, CERTIFIED REGISTERED

## 2021-04-16 PROCEDURE — 63710000001 ATORVASTATIN 20 MG TABLET: Performed by: INTERNAL MEDICINE

## 2021-04-16 PROCEDURE — 25010000002 DEXAMETHASONE PER 1 MG: Performed by: NURSE ANESTHETIST, CERTIFIED REGISTERED

## 2021-04-16 PROCEDURE — 85347 COAGULATION TIME ACTIVATED: CPT

## 2021-04-16 PROCEDURE — 25010000002 KETOROLAC TROMETHAMINE PER 15 MG: Performed by: INTERNAL MEDICINE

## 2021-04-16 PROCEDURE — 63710000001 SOTALOL 80 MG TABLET: Performed by: INTERNAL MEDICINE

## 2021-04-16 PROCEDURE — 83036 HEMOGLOBIN GLYCOSYLATED A1C: CPT | Performed by: PHYSICIAN ASSISTANT

## 2021-04-16 PROCEDURE — C1760 CLOSURE DEV, VASC: HCPCS | Performed by: INTERNAL MEDICINE

## 2021-04-16 RX ORDER — MIDAZOLAM HYDROCHLORIDE 1 MG/ML
0.5 INJECTION INTRAMUSCULAR; INTRAVENOUS
Status: DISCONTINUED | OUTPATIENT
Start: 2021-04-16 | End: 2021-04-16 | Stop reason: HOSPADM

## 2021-04-16 RX ORDER — ONDANSETRON 2 MG/ML
4 INJECTION INTRAMUSCULAR; INTRAVENOUS EVERY 6 HOURS PRN
Status: DISCONTINUED | OUTPATIENT
Start: 2021-04-16 | End: 2021-04-17 | Stop reason: HOSPADM

## 2021-04-16 RX ORDER — SODIUM CHLORIDE, SODIUM LACTATE, POTASSIUM CHLORIDE, CALCIUM CHLORIDE 600; 310; 30; 20 MG/100ML; MG/100ML; MG/100ML; MG/100ML
9 INJECTION, SOLUTION INTRAVENOUS CONTINUOUS
Status: DISCONTINUED | OUTPATIENT
Start: 2021-04-16 | End: 2021-04-16

## 2021-04-16 RX ORDER — ACETAMINOPHEN 325 MG/1
650 TABLET ORAL EVERY 4 HOURS PRN
Status: DISCONTINUED | OUTPATIENT
Start: 2021-04-16 | End: 2021-04-17 | Stop reason: HOSPADM

## 2021-04-16 RX ORDER — METOPROLOL SUCCINATE 50 MG/1
50 TABLET, EXTENDED RELEASE ORAL NIGHTLY
Status: DISCONTINUED | OUTPATIENT
Start: 2021-04-16 | End: 2021-04-16

## 2021-04-16 RX ORDER — GLYCOPYRROLATE 0.2 MG/ML
INJECTION INTRAMUSCULAR; INTRAVENOUS AS NEEDED
Status: DISCONTINUED | OUTPATIENT
Start: 2021-04-16 | End: 2021-04-16 | Stop reason: SURG

## 2021-04-16 RX ORDER — PANTOPRAZOLE SODIUM 40 MG/1
40 TABLET, DELAYED RELEASE ORAL
Status: DISCONTINUED | OUTPATIENT
Start: 2021-04-17 | End: 2021-04-17 | Stop reason: HOSPADM

## 2021-04-16 RX ORDER — MIDAZOLAM HYDROCHLORIDE 1 MG/ML
1 INJECTION INTRAMUSCULAR; INTRAVENOUS
Status: DISCONTINUED | OUTPATIENT
Start: 2021-04-16 | End: 2021-04-16 | Stop reason: HOSPADM

## 2021-04-16 RX ORDER — ONDANSETRON 2 MG/ML
INJECTION INTRAMUSCULAR; INTRAVENOUS AS NEEDED
Status: DISCONTINUED | OUTPATIENT
Start: 2021-04-16 | End: 2021-04-16 | Stop reason: SURG

## 2021-04-16 RX ORDER — LIDOCAINE HYDROCHLORIDE 10 MG/ML
0.5 INJECTION, SOLUTION EPIDURAL; INFILTRATION; INTRACAUDAL; PERINEURAL ONCE AS NEEDED
Status: DISCONTINUED | OUTPATIENT
Start: 2021-04-16 | End: 2021-04-16 | Stop reason: HOSPADM

## 2021-04-16 RX ORDER — SOTALOL HYDROCHLORIDE 80 MG/1
80 TABLET ORAL EVERY 12 HOURS SCHEDULED
Status: DISCONTINUED | OUTPATIENT
Start: 2021-04-16 | End: 2021-04-17 | Stop reason: HOSPADM

## 2021-04-16 RX ORDER — SODIUM CHLORIDE 0.9 % (FLUSH) 0.9 %
10 SYRINGE (ML) INJECTION AS NEEDED
Status: DISCONTINUED | OUTPATIENT
Start: 2021-04-16 | End: 2021-04-16 | Stop reason: HOSPADM

## 2021-04-16 RX ORDER — ATORVASTATIN CALCIUM 20 MG/1
20 TABLET, FILM COATED ORAL NIGHTLY
COMMUNITY
End: 2022-04-06 | Stop reason: ALTCHOICE

## 2021-04-16 RX ORDER — ROCURONIUM BROMIDE 10 MG/ML
INJECTION, SOLUTION INTRAVENOUS AS NEEDED
Status: DISCONTINUED | OUTPATIENT
Start: 2021-04-16 | End: 2021-04-16 | Stop reason: SURG

## 2021-04-16 RX ORDER — ACETAMINOPHEN 325 MG/1
650 TABLET ORAL EVERY 4 HOURS PRN
Status: DISCONTINUED | OUTPATIENT
Start: 2021-04-16 | End: 2021-04-16 | Stop reason: HOSPADM

## 2021-04-16 RX ORDER — PROPOFOL 10 MG/ML
VIAL (ML) INTRAVENOUS AS NEEDED
Status: DISCONTINUED | OUTPATIENT
Start: 2021-04-16 | End: 2021-04-16 | Stop reason: SURG

## 2021-04-16 RX ORDER — LIDOCAINE HYDROCHLORIDE 10 MG/ML
INJECTION, SOLUTION INFILTRATION; PERINEURAL AS NEEDED
Status: DISCONTINUED | OUTPATIENT
Start: 2021-04-16 | End: 2021-04-16 | Stop reason: HOSPADM

## 2021-04-16 RX ORDER — SODIUM CHLORIDE 0.9 % (FLUSH) 0.9 %
3 SYRINGE (ML) INJECTION EVERY 12 HOURS SCHEDULED
Status: DISCONTINUED | OUTPATIENT
Start: 2021-04-16 | End: 2021-04-16 | Stop reason: HOSPADM

## 2021-04-16 RX ORDER — FENTANYL CITRATE 50 UG/ML
50 INJECTION, SOLUTION INTRAMUSCULAR; INTRAVENOUS
Status: DISCONTINUED | OUTPATIENT
Start: 2021-04-16 | End: 2021-04-16 | Stop reason: HOSPADM

## 2021-04-16 RX ORDER — ACETAMINOPHEN 500 MG
500 TABLET ORAL EVERY 6 HOURS PRN
Status: DISCONTINUED | OUTPATIENT
Start: 2021-04-16 | End: 2021-04-16 | Stop reason: SDUPTHER

## 2021-04-16 RX ORDER — FAMOTIDINE 10 MG/ML
20 INJECTION, SOLUTION INTRAVENOUS ONCE
Status: DISCONTINUED | OUTPATIENT
Start: 2021-04-16 | End: 2021-04-16 | Stop reason: HOSPADM

## 2021-04-16 RX ORDER — NITROGLYCERIN 0.4 MG/1
0.4 TABLET SUBLINGUAL
Status: DISCONTINUED | OUTPATIENT
Start: 2021-04-16 | End: 2021-04-17 | Stop reason: HOSPADM

## 2021-04-16 RX ORDER — FUROSEMIDE 10 MG/ML
40 INJECTION INTRAMUSCULAR; INTRAVENOUS ONCE
Status: COMPLETED | OUTPATIENT
Start: 2021-04-16 | End: 2021-04-16

## 2021-04-16 RX ORDER — SUCRALFATE 1 G/1
1 TABLET ORAL
Status: DISCONTINUED | OUTPATIENT
Start: 2021-04-16 | End: 2021-04-17 | Stop reason: HOSPADM

## 2021-04-16 RX ORDER — ATORVASTATIN CALCIUM 20 MG/1
20 TABLET, FILM COATED ORAL NIGHTLY
Status: DISCONTINUED | OUTPATIENT
Start: 2021-04-16 | End: 2021-04-17 | Stop reason: HOSPADM

## 2021-04-16 RX ORDER — SODIUM CHLORIDE 0.9 % (FLUSH) 0.9 %
10 SYRINGE (ML) INJECTION EVERY 12 HOURS SCHEDULED
Status: DISCONTINUED | OUTPATIENT
Start: 2021-04-16 | End: 2021-04-16 | Stop reason: HOSPADM

## 2021-04-16 RX ORDER — NEOSTIGMINE METHYLSULFATE 1 MG/ML
INJECTION, SOLUTION INTRAVENOUS AS NEEDED
Status: DISCONTINUED | OUTPATIENT
Start: 2021-04-16 | End: 2021-04-16 | Stop reason: SURG

## 2021-04-16 RX ORDER — DEXAMETHASONE SODIUM PHOSPHATE 4 MG/ML
INJECTION, SOLUTION INTRA-ARTICULAR; INTRALESIONAL; INTRAMUSCULAR; INTRAVENOUS; SOFT TISSUE AS NEEDED
Status: DISCONTINUED | OUTPATIENT
Start: 2021-04-16 | End: 2021-04-16 | Stop reason: SURG

## 2021-04-16 RX ORDER — HEPARIN SODIUM 1000 [USP'U]/ML
INJECTION, SOLUTION INTRAVENOUS; SUBCUTANEOUS AS NEEDED
Status: DISCONTINUED | OUTPATIENT
Start: 2021-04-16 | End: 2021-04-16 | Stop reason: HOSPADM

## 2021-04-16 RX ORDER — SODIUM CHLORIDE 9 MG/ML
INJECTION, SOLUTION INTRAVENOUS CONTINUOUS PRN
Status: COMPLETED | OUTPATIENT
Start: 2021-04-16 | End: 2021-04-16

## 2021-04-16 RX ORDER — BUPIVACAINE HCL/0.9 % NACL/PF 0.125 %
PLASTIC BAG, INJECTION (ML) EPIDURAL AS NEEDED
Status: DISCONTINUED | OUTPATIENT
Start: 2021-04-16 | End: 2021-04-16 | Stop reason: SURG

## 2021-04-16 RX ORDER — PROTAMINE SULFATE 10 MG/ML
INJECTION, SOLUTION INTRAVENOUS AS NEEDED
Status: DISCONTINUED | OUTPATIENT
Start: 2021-04-16 | End: 2021-04-16 | Stop reason: HOSPADM

## 2021-04-16 RX ORDER — KETOROLAC TROMETHAMINE 15 MG/ML
15 INJECTION, SOLUTION INTRAMUSCULAR; INTRAVENOUS EVERY 8 HOURS
Status: DISCONTINUED | OUTPATIENT
Start: 2021-04-16 | End: 2021-04-17 | Stop reason: HOSPADM

## 2021-04-16 RX ORDER — ONDANSETRON 2 MG/ML
4 INJECTION INTRAMUSCULAR; INTRAVENOUS ONCE AS NEEDED
Status: DISCONTINUED | OUTPATIENT
Start: 2021-04-16 | End: 2021-04-16 | Stop reason: HOSPADM

## 2021-04-16 RX ORDER — SODIUM CHLORIDE 9 MG/ML
1 INJECTION, SOLUTION INTRAVENOUS CONTINUOUS
Status: ACTIVE | OUTPATIENT
Start: 2021-04-16 | End: 2021-04-16

## 2021-04-16 RX ORDER — HYDROCODONE BITARTRATE AND ACETAMINOPHEN 5; 325 MG/1; MG/1
1 TABLET ORAL EVERY 4 HOURS PRN
Status: DISCONTINUED | OUTPATIENT
Start: 2021-04-16 | End: 2021-04-17 | Stop reason: HOSPADM

## 2021-04-16 RX ORDER — FAMOTIDINE 20 MG/1
20 TABLET, FILM COATED ORAL ONCE
Status: COMPLETED | OUTPATIENT
Start: 2021-04-16 | End: 2021-04-16

## 2021-04-16 RX ADMIN — ONDANSETRON 4 MG: 2 INJECTION INTRAMUSCULAR; INTRAVENOUS at 08:44

## 2021-04-16 RX ADMIN — ROCURONIUM BROMIDE 20 MG: 10 INJECTION INTRAVENOUS at 08:47

## 2021-04-16 RX ADMIN — FENTANYL CITRATE 50 MCG: 50 INJECTION, SOLUTION INTRAMUSCULAR; INTRAVENOUS at 12:40

## 2021-04-16 RX ADMIN — SOTALOL HYDROCHLORIDE 80 MG: 80 TABLET ORAL at 19:49

## 2021-04-16 RX ADMIN — SODIUM CHLORIDE 1 ML/KG/HR: 9 INJECTION, SOLUTION INTRAVENOUS at 07:34

## 2021-04-16 RX ADMIN — SODIUM CHLORIDE, POTASSIUM CHLORIDE, SODIUM LACTATE AND CALCIUM CHLORIDE: 600; 310; 30; 20 INJECTION, SOLUTION INTRAVENOUS at 08:10

## 2021-04-16 RX ADMIN — FAMOTIDINE 20 MG: 20 TABLET, FILM COATED ORAL at 07:34

## 2021-04-16 RX ADMIN — PROPOFOL 150 MG: 10 INJECTION, EMULSION INTRAVENOUS at 08:23

## 2021-04-16 RX ADMIN — Medication 100 MCG: at 08:40

## 2021-04-16 RX ADMIN — NEOSTIGMINE METHYLSULFATE 3 MG: 1 INJECTION, SOLUTION INTRAVENOUS at 11:32

## 2021-04-16 RX ADMIN — Medication 100 MCG: at 08:59

## 2021-04-16 RX ADMIN — SUCRALFATE 1 G: 1 TABLET ORAL at 18:39

## 2021-04-16 RX ADMIN — Medication 100 MCG: at 09:05

## 2021-04-16 RX ADMIN — VANCOMYCIN HYDROCHLORIDE 2000 MG: 10 INJECTION, POWDER, LYOPHILIZED, FOR SOLUTION INTRAVENOUS at 08:20

## 2021-04-16 RX ADMIN — ROCURONIUM BROMIDE 50 MG: 10 INJECTION INTRAVENOUS at 08:23

## 2021-04-16 RX ADMIN — LIDOCAINE HYDROCHLORIDE 50 ML: 10 INJECTION, SOLUTION INFILTRATION; PERINEURAL at 08:23

## 2021-04-16 RX ADMIN — PHENYLEPHRINE HYDROCHLORIDE 0.3 MCG/KG/MIN: 10 INJECTION INTRAVENOUS at 09:05

## 2021-04-16 RX ADMIN — ATORVASTATIN CALCIUM 20 MG: 20 TABLET, FILM COATED ORAL at 19:52

## 2021-04-16 RX ADMIN — KETOROLAC TROMETHAMINE 15 MG: 15 INJECTION, SOLUTION INTRAMUSCULAR; INTRAVENOUS at 21:51

## 2021-04-16 RX ADMIN — DEXAMETHASONE SODIUM PHOSPHATE 4 MG: 4 INJECTION, SOLUTION INTRAMUSCULAR; INTRAVENOUS at 08:44

## 2021-04-16 RX ADMIN — GLYCOPYRROLATE 0.4 MG: 0.4 INJECTION INTRAMUSCULAR; INTRAVENOUS at 11:32

## 2021-04-16 RX ADMIN — KETOROLAC TROMETHAMINE 15 MG: 15 INJECTION, SOLUTION INTRAMUSCULAR; INTRAVENOUS at 13:58

## 2021-04-16 RX ADMIN — FUROSEMIDE 40 MG: 10 INJECTION, SOLUTION INTRAVENOUS at 18:40

## 2021-04-16 NOTE — ANESTHESIA POSTPROCEDURE EVALUATION
Patient: Benoit Pham    Procedure Summary     Date: 04/16/21 Room / Location: [facility] / [facility] INVASIVE LOCATION    Anesthesia Start: 0810 Anesthesia Stop: 1205    Procedure: Ablation atrial fibrillation- within the next 4 weeks (N/A ) Diagnosis:       Paroxysmal A-fib (CMS/HCC)      (Persistent Afib)    Providers: Varun Reyna MD Provider: Blue Adams MD    Anesthesia Type: general ASA Status: 3          Anesthesia Type: general    Vitals  No vitals data found for the desired time range.          Post Anesthesia Care and Evaluation    Patient location during evaluation: PACU  Patient participation: complete - patient participated  Level of consciousness: awake and alert  Pain management: adequate  Airway patency: patent  Anesthetic complications: No anesthetic complications  PONV Status: none  Cardiovascular status: hemodynamically stable and acceptable  Respiratory status: nonlabored ventilation, acceptable and nasal cannula  Hydration status: acceptable

## 2021-04-16 NOTE — ANESTHESIA PROCEDURE NOTES
Airway  Urgency: elective    Date/Time: 4/16/2021 8:24 AM  Airway not difficult    General Information and Staff    Patient location during procedure: OR  CRNA: Emerita Gill CRNA    Indications and Patient Condition  Indications for airway management: airway protection    Preoxygenated: yes  MILS not maintained throughout  Mask difficulty assessment: 1 - vent by mask    Final Airway Details  Final airway type: endotracheal airway      Successful airway: ETT  Cuffed: yes   Successful intubation technique: direct laryngoscopy  Facilitating devices/methods: intubating stylet  Endotracheal tube insertion site: oral  Blade: Lopez  Blade size: 2  ETT size (mm): 7.0  Cormack-Lehane Classification: grade I - full view of glottis  Placement verified by: chest auscultation and capnometry   Measured from: lips  ETT/EBT  to lips (cm): 20  Number of attempts at approach: 1  Assessment: lips, teeth, and gum same as pre-op and atraumatic intubation    Additional Comments  Negative epigastric sounds, Breath sound equal bilaterally with symmetric chest rise and fall.  Teeth intact, atraumaric

## 2021-04-16 NOTE — H&P
Cardiology H&P     Benoit Pham  1945  [phone]  There is no work phone number on file.    04/16/21    DATE OF ADMISSION: 4/16/2021  [facility]    Mitul Lopez MD  [address] [place] [address] / [facility] [id number]  Referring Provider: Varun Reyna MD       CC: Atrial Fibrillation    Problem List:    1. Persistent Atrial Fibrillation   a. CHADSvasc = 2  b. Echocardiogram 7/19/2013: EF 60-65%, no significant valve abnormalities   c. LHC 11/11/2013: minimal luminal irregularities involving RCA and LAD, normal EF   d. Treated with beta blocker   e. ECV with ERAF 3/16/21  f. Echo, 3/16/21, EF 42%, The following left ventricular wall segments are hypokinetic: basal inferolateral, mid inferolateral, apical inferior, mid inferior, apical septal and basal inferior.  2. Sick Sinus Syndrome  a. Dual Chamber [employer]  PPM 7/2013  b. Echo, 3/16/21, EF 42%, The following left ventricular wall segments are hypokinetic: basal inferolateral, mid inferolateral, apical inferior, mid inferior, apical septal and basal inferior.     3. SVT  a. S/p RFA AVnRT in [place] in 2011  4. Tobacco use - resolved  5. DJD  6. GERD  7. Hiatal Hernia  8. TIANA - on CPAP  9. Asthma/COPD  10. Anxiety/Depression - with previous suicide attempt  11. BPH  12. DJD  13. Diverticulosis  14. Surgical History:  a. Tonsillectomy  b. Right total hip replacement    History of Present Illness:     Mr. Huertas is a pleasant 75-year-old white male, known patient of Dr. Reyna who presents today with plans to undergo EPS +/- PVA for his persistent symptomatic atrial fibrillation. He is s/p [employer] pacemaker 7/2013. He had an increasing atrial fibrillation burden documented on recent pacemaker interrogations.  He has symptoms of atrial fibrillation including fatigue, worsening shortness of breath with exertion, lightheadedness, near syncope, and chest tightness.  He states the symptoms are moderate to severe in  intensity.They occur on a daily basis. There are no specific alleviating factors he has been able to identify. He is not able to complete all the daily tasks and exercise that he let would like to. He denies ever being treated with any antiarrhythmic therapy in the past. Prior treatment for A. fib included pacemaker placement and management with beta-blockers. Patient underwent ECV with Dr. Reyna 3/6/21, but unfortunately had ERAF and continued to be paced at 75-85 bpm in AF. Limited ECHO showed EF of 42%. He has been anticoagulated with Xarelto with no bleeding problems.No recent hospitalizations, emergency room visits. He has held his Xarelto x 2 day and been NPO since MN.       Allergies   Allergen Reactions   • Penicillins Hives   • Nuts Mental Status Change     WALNUTS - BECOMES VERY SLEEPY       Prior to Admission Medications     Prescriptions Last Dose Informant Patient Reported? Taking?    acetaminophen (TYLENOL) 500 MG tablet  Medication Bottle Yes Yes    Take 500 mg by mouth Every 6 (Six) Hours As Needed for Mild Pain .    atorvastatin (LIPITOR) 20 MG tablet 4/14/2021  Yes Yes    Take 20 mg by mouth Every Night.    Cholecalciferol (Vitamin D) 50 MCG (2000 UT) capsule 4/14/2021 Self Yes Yes    Take 2,000 Units by mouth Daily.    cholecalciferol (VITAMIN D3) 25 MCG (1000 UT) tablet 4/14/2021 Medication Bottle Yes Yes    Take 1,000 Units by mouth Daily.    cycloSPORINE (RESTASIS) 0.05 % ophthalmic emulsion 4/14/2021 Medication Bottle Yes Yes    Administer 1 drop to both eyes 2 (Two) Times a Day.    glucosamine-chondroitin 500-400 MG capsule capsule 4/14/2021 Self Yes Yes    Take 1 capsule by mouth 2 (Two) Times a Day With Meals.    ibuprofen (ADVIL,MOTRIN) 800 MG tablet 4/14/2021 Medication Bottle Yes Yes    Take 800 mg by mouth Every 8 (Eight) Hours As Needed.    Magnesium Cl-Calcium Carbonate (SLOW-MAG PO) 4/14/2021 Medication Bottle Yes Yes    Take 1 tablet by mouth 2 (two) times a day.    metoprolol  succinate XL (TOPROL-XL) 50 MG 24 hr tablet 4/14/2021 Medication Bottle Yes Yes    Take 50 mg by mouth Every Night.    multivitamin with minerals tablet tablet 4/14/2021 Medication Bottle Yes Yes    Take 1 tablet by mouth Daily.    nitroglycerin (NITROSTAT) 0.4 MG SL tablet  Medication Bottle Yes Yes    Place 0.4 mg under the tongue Every 5 (Five) Minutes As Needed for Chest Pain. Take no more than 3 doses in 15 minutes.    rivaroxaban (XARELTO) 20 MG tablet 4/13/2021 Medication Bottle Yes Yes    Take 20 mg by mouth Daily.    vitamin B-12 (CYANOCOBALAMIN) 1000 MCG tablet 4/14/2021 Medication Bottle Yes Yes    Take 1,000 mcg by mouth Daily.    Respiratory Therapy Supplies (CareTouch CPAP & BIPAP Hose) misc   Yes No            Current Facility-Administered Medications:   •  acetaminophen (TYLENOL) tablet 650 mg, 650 mg, Oral, Q4H PRN, Gabrielle Mustafa PA  •  famotidine (PEPCID) injection 20 mg, 20 mg, Intravenous, Once, Enrique Ramos MD  •  lactated ringers infusion, 9 mL/hr, Intravenous, Continuous, Enrique Ramos MD  •  lidocaine PF 1% (XYLOCAINE) injection 0.5 mL, 0.5 mL, Injection, Once PRN, Enrique Ramos MD  •  midazolam (VERSED) injection 0.5 mg, 0.5 mg, Intravenous, Q10 Min PRN **OR** midazolam (VERSED) injection 1 mg, 1 mg, Intravenous, Q10 Min PRN, Enrique Ramos MD  •  sodium chloride 0.9 % flush 10 mL, 10 mL, Intravenous, Q12H, Enrique Ramos MD  •  sodium chloride 0.9 % flush 10 mL, 10 mL, Intravenous, PRN, Enrique Ramos MD  •  sodium chloride 0.9 % flush 10 mL, 10 mL, Intravenous, PRN, Gabrielle Mustafa PA  •  sodium chloride 0.9 % flush 3 mL, 3 mL, Intravenous, Q12H, Gabrielle Mustafa PA  •  sodium chloride 0.9 % infusion, 1 mL/kg/hr (Order-Specific), Intravenous, Continuous, Gabrielle Mustafa PA, Last Rate: 99.8 mL/hr at 04/16/21 0734, 1 mL/kg/hr at 04/16/21 0734  •  vancomycin 2000 mg/500 mL 0.9% NS IVPB ([facility]), 20 mg/kg, Intravenous, Once, Gabrielle Mustafa, PA    Social History     Socioeconomic  "History   • Marital status: Single     Spouse name: Not on file   • Number of children: Not on file   • Years of education: Not on file   • Highest education level: Not on file   Tobacco Use   • Smoking status: Former Smoker     Packs/day: 1.00     Quit date:      Years since quittin.3   • Smokeless tobacco: Never Used   Vaping Use   • Vaping Use: Never used   Substance and Sexual Activity   • Alcohol use: Yes     Comment: 2 glasses wine/day   • Drug use: Never       History reviewed. No pertinent family history.    REVIEW OF SYSTEMS:   CONST:  No weight loss, fever, chills, +weakness + fatigue. +Chest tightness  HEENT:  No visual loss, blurred vision, double vision, yellow sclerae.                   No hearing loss, congestion, sore throat.   SKIN:      No rashes, urticaria, ulcers, sores.     RESP:     + shortness of breath, - hemoptysis, cough, sputum.   GI:           No anorexia, nausea, vomiting, diarrhea. No abdominal pain, melena.   :         No burning on urination, hematuria or increased frequency.  ENDO:    No diaphoresis, cold or heat intolerance. No polyuria or polydipsia.   NEURO:  No headache, +dizziness, +syncope, - paralysis, ataxia, or parasthesias.                  No change in bowel or bladder control. No history of CVA/TIA  MUSC:    No muscle, back pain, joint pain or stiffness.   HEME:    No anemia, bleeding, bruising. No history of DVT/PE.  PSYCH:  No history of depression, anxiety    Vitals:    21 0636 21 0638   BP: 105/81 114/83   BP Location: Right arm Left arm   Patient Position: Lying Lying   Pulse:  75   Temp:  97.5 °F (36.4 °C)   TempSrc:  Temporal   SpO2:  95%   Weight:  98.9 kg (218 lb 0.6 oz)   Height:  165.1 cm (65\")         Vital Sign Min/Max for last 24 hours  Temp  Min: 97.5 °F (36.4 °C)  Max: 97.5 °F (36.4 °C)   BP  Min: 105/81  Max: 114/83   Pulse  Min: 75  Max: 75   No data recorded   SpO2  Min: 95 %  Max: 95 %   No data recorded    No intake or output " data in the 24 hours ending 04/16/21 0747          Physical Exam:  GEN: Well nourished, Well- developed  No acute distress  HEENT: Normocephalic, Atraumatic, PERRLA, moist mucous membranes  NECK: supple, NO JVD, no thyromegaly, no lymphadenopathy  CARD: irr irr, reg rate, no murmur, gallop, rub  LUNGS: Clear to auscultataion, normal respiratory effort  ABDOMEN: Soft, nontender, normal bowel sounds  EXTREMITIES:No gross deformities,  No clubbing, cyanosis, or edema  SKIN: Warm, dry  NEURO: No focal deficits  PSYCHIATRIC: Normal affect and mood      I personally viewed and interpreted the patient's EKG/Telemetry/lab data    Data:   Results from last 7 days   Lab Units 04/16/21  0643   WBC 10*3/mm3 7.03   HEMOGLOBIN g/dL 13.2   HEMATOCRIT % 37.5   PLATELETS 10*3/mm3 213     Results from last 7 days   Lab Units 04/16/21  0643   SODIUM mmol/L 141   POTASSIUM mmol/L 4.4   CHLORIDE mmol/L 107   CO2 mmol/L 26.0   BUN mg/dL 14   CREATININE mg/dL 0.70*   GLUCOSE mg/dL 91                                  No intake or output data in the 24 hours ending 04/16/21 0747    Chest X-Ray:  Imaging Results (Last 24 Hours)     ** No results found for the last 24 hours. **        COVID19   Date Value Ref Range Status   04/14/2021 Not Detected Not Detected - Ref. Range Final       Telemetry: AF 75 bpm  EKG: none for review today         Assessment and Plan:     1) Symptomatic persistent atrial fibrillation/tachycardia-bradycardia syndrome  -Status post [employer] dual-chamber pacemaker  -Patient moderately to severely symptomatic with sob, fatigue, chest tightness, dizziness, and near syncope on a daily basis. Recently underwent successful ECV with ERAF 3/16/21. Patient presents today with plans to undergo EPS +/- PVA with Dr. Reyna. The risk, benefits, alternatives to external cardioversion have been reviewed with the patient who wishes to proceed. He has held his Xarelto x 2 day and been NPO since MN.     -Echo, 3/16/21, EF  42%, The following left ventricular wall segments are hypokinetic: basal inferolateral, mid inferolateral, apical inferior, mid inferior, apical septal and basal inferior.       2) Anticoagulation: [id number] score of 2-continue Xarelto, held since 4/14/21 as instructed for todays procedure.

## 2021-04-17 VITALS
SYSTOLIC BLOOD PRESSURE: 91 MMHG | OXYGEN SATURATION: 92 % | HEIGHT: 65 IN | DIASTOLIC BLOOD PRESSURE: 53 MMHG | TEMPERATURE: 98.7 F | BODY MASS INDEX: 39.32 KG/M2 | RESPIRATION RATE: 16 BRPM | WEIGHT: 236 LBS | HEART RATE: 70 BPM

## 2021-04-17 PROCEDURE — 63710000001 PANTOPRAZOLE 40 MG TABLET DELAYED-RELEASE: Performed by: INTERNAL MEDICINE

## 2021-04-17 PROCEDURE — 93005 ELECTROCARDIOGRAM TRACING: CPT | Performed by: INTERNAL MEDICINE

## 2021-04-17 PROCEDURE — A9270 NON-COVERED ITEM OR SERVICE: HCPCS | Performed by: INTERNAL MEDICINE

## 2021-04-17 PROCEDURE — 99213 OFFICE O/P EST LOW 20 MIN: CPT | Performed by: PHYSICIAN ASSISTANT

## 2021-04-17 PROCEDURE — 63710000001 SOTALOL 80 MG TABLET: Performed by: INTERNAL MEDICINE

## 2021-04-17 PROCEDURE — 25010000002 KETOROLAC TROMETHAMINE PER 15 MG: Performed by: INTERNAL MEDICINE

## 2021-04-17 PROCEDURE — 63710000001 SUCRALFATE 1 G TABLET: Performed by: INTERNAL MEDICINE

## 2021-04-17 RX ORDER — PANTOPRAZOLE SODIUM 40 MG/1
40 TABLET, DELAYED RELEASE ORAL DAILY
Qty: 30 TABLET | Refills: 6 | Status: SHIPPED | OUTPATIENT
Start: 2021-04-17 | End: 2022-04-06 | Stop reason: ALTCHOICE

## 2021-04-17 RX ORDER — SOTALOL HYDROCHLORIDE 80 MG/1
80 TABLET ORAL EVERY 12 HOURS SCHEDULED
Qty: 60 TABLET | Refills: 6 | Status: SHIPPED | OUTPATIENT
Start: 2021-04-17 | End: 2022-04-06 | Stop reason: ALTCHOICE

## 2021-04-17 RX ORDER — SUCRALFATE ORAL 1 G/10ML
1 SUSPENSION ORAL 3 TIMES DAILY
Qty: 210 ML | Refills: 0 | Status: SHIPPED | OUTPATIENT
Start: 2021-04-17 | End: 2021-04-24

## 2021-04-17 RX ORDER — PANTOPRAZOLE SODIUM 40 MG/1
40 TABLET, DELAYED RELEASE ORAL DAILY
Qty: 30 TABLET | Refills: 0 | Status: SHIPPED | OUTPATIENT
Start: 2021-04-17 | End: 2021-05-17

## 2021-04-17 RX ORDER — SUCRALFATE 1 G/1
1 TABLET ORAL 3 TIMES DAILY
Qty: 21 TABLET | Refills: 0 | Status: SHIPPED | OUTPATIENT
Start: 2021-04-17 | End: 2021-04-24

## 2021-04-17 RX ADMIN — SOTALOL HYDROCHLORIDE 80 MG: 80 TABLET ORAL at 08:41

## 2021-04-17 RX ADMIN — SUCRALFATE 1 G: 1 TABLET ORAL at 11:34

## 2021-04-17 RX ADMIN — PANTOPRAZOLE SODIUM 40 MG: 40 TABLET, DELAYED RELEASE ORAL at 05:53

## 2021-04-17 RX ADMIN — SUCRALFATE 1 G: 1 TABLET ORAL at 05:53

## 2021-04-17 RX ADMIN — KETOROLAC TROMETHAMINE 15 MG: 15 INJECTION, SOLUTION INTRAMUSCULAR; INTRAVENOUS at 05:53

## 2021-04-17 NOTE — PLAN OF CARE
Problem: Adult Inpatient Plan of Care  Goal: Plan of Care Review  Flowsheets (Taken 4/17/2021 0459)  Progress: improving  Plan of Care Reviewed With: patient  Outcome Summary: Pt s/p ablation. Bilat groin sites. Remained soft/non tender throughout shift with no bleeding. Pulses palpable. Pt started on Sotolol. Ekg obtained and monitored Qtc. Educated pt on sotolol therapy and groin site monitroing. Pt denied pain. RA. VSS. Plan of care discussed. Pt verbalized understanding.  Goal: Absence of Hospital-Acquired Illness or Injury  Intervention: Identify and Manage Fall Risk  Recent Flowsheet Documentation  Taken 4/17/2021 0400 by Sp Conner, RN  Safety Promotion/Fall Prevention:   activity supervised   assistive device/personal items within reach   clutter free environment maintained   nonskid shoes/slippers when out of bed   room organization consistent   safety round/check completed   toileting scheduled  Taken 4/17/2021 0200 by Sp Conner, RN  Safety Promotion/Fall Prevention:   activity supervised   assistive device/personal items within reach   clutter free environment maintained   nonskid shoes/slippers when out of bed   lighting adjusted   room organization consistent   safety round/check completed   toileting scheduled  Taken 4/17/2021 0000 by Sp Conner, RN  Safety Promotion/Fall Prevention:   activity supervised   assistive device/personal items within reach   clutter free environment maintained   nonskid shoes/slippers when out of bed   room organization consistent   safety round/check completed   toileting scheduled  Taken 4/16/2021 2200 by Sp Conner, RN  Safety Promotion/Fall Prevention:   activity supervised   assistive device/personal items within reach   clutter free environment maintained   room organization consistent   safety round/check completed   toileting scheduled  Taken 4/16/2021 2000 by Sp Conner, RN  Safety Promotion/Fall Prevention:   assistive  device/personal items within reach   activity supervised   clutter free environment maintained   nonskid shoes/slippers when out of bed   room organization consistent   safety round/check completed   toileting scheduled  Intervention: Prevent Skin Injury  Recent Flowsheet Documentation  Taken 4/17/2021 0400 by Sp Conner RN  Body Position:   position changed independently   side-lying, left  Taken 4/17/2021 0200 by Sp Conner RN  Body Position:   position changed independently   neutral body alignment   supine  Taken 4/17/2021 0000 by Sp Conner RN  Body Position:   position changed independently   side-lying, left  Taken 4/16/2021 2200 by Sp Conner RN  Body Position:   position changed independently   side-lying, left  Taken 4/16/2021 2000 by Sp Conner RN  Body Position:   position changed independently   neutral body alignment  Intervention: Prevent and Manage VTE (venous thromboembolism) Risk  Recent Flowsheet Documentation  Taken 4/16/2021 2000 by Sp Conner RN  VTE Prevention/Management:   bilateral   dorsiflexion/plantar flexion performed   bleeding risk factor(s) identified  Intervention: Prevent Infection  Recent Flowsheet Documentation  Taken 4/17/2021 0400 by Sp Conner RN  Infection Prevention:   environmental surveillance performed   rest/sleep promoted   single patient room provided  Taken 4/17/2021 0200 by Sp Conner RN  Infection Prevention:   environmental surveillance performed   rest/sleep promoted   single patient room provided  Goal: Optimal Comfort and Wellbeing  Intervention: Provide Person-Centered Care  Recent Flowsheet Documentation  Taken 4/16/2021 2000 by Sp Conner RN  Trust Relationship/Rapport:   care explained   choices provided   questions answered   questions encouraged     Problem: Fall Injury Risk  Goal: Absence of Fall and Fall-Related Injury  Intervention: Promote Injury-Free Environment  Recent Flowsheet  Documentation  Taken 4/17/2021 0400 by Sp Conner RN  Safety Promotion/Fall Prevention:   activity supervised   assistive device/personal items within reach   clutter free environment maintained   nonskid shoes/slippers when out of bed   room organization consistent   safety round/check completed   toileting scheduled  Taken 4/17/2021 0200 by Sp Conenr RN  Safety Promotion/Fall Prevention:   activity supervised   assistive device/personal items within reach   clutter free environment maintained   nonskid shoes/slippers when out of bed   lighting adjusted   room organization consistent   safety round/check completed   toileting scheduled  Taken 4/17/2021 0000 by Sp Conner RN  Safety Promotion/Fall Prevention:   activity supervised   assistive device/personal items within reach   clutter free environment maintained   nonskid shoes/slippers when out of bed   room organization consistent   safety round/check completed   toileting scheduled  Taken 4/16/2021 2200 by Sp Conner RN  Safety Promotion/Fall Prevention:   activity supervised   assistive device/personal items within reach   clutter free environment maintained   room organization consistent   safety round/check completed   toileting scheduled  Taken 4/16/2021 2000 by Sp Conner RN  Safety Promotion/Fall Prevention:   assistive device/personal items within reach   activity supervised   clutter free environment maintained   nonskid shoes/slippers when out of bed   room organization consistent   safety round/check completed   toileting scheduled     Problem: Pain (Cardiac Catheterization)  Goal: Acceptable Pain Control  Intervention: Prevent or Manage Pain  Recent Flowsheet Documentation  Taken 4/16/2021 2000 by Sp Conner RN  Diversional Activities:   television   smartphone   Goal Outcome Evaluation:  Plan of Care Reviewed With: patient  Progress: improving  Outcome Summary: Pt s/p ablation. Bilat groin sites.  Remained soft/non tender throughout shift with no bleeding. Pulses palpable. Pt started on Sotolol. Ekg obtained and monitored Qtc. Educated pt on sotolol therapy and groin site monitroing. Pt denied pain. RA. VSS. Plan of care discussed. Pt verbalized understanding.

## 2021-04-17 NOTE — PROGRESS NOTES
CARDIOLOGY PROGRESS NOTE           4/17/2021 08:53 EDT    Admit Date: 4/16/2021    Admit Diagnosis: Paroxysmal A-fib (CMS/HCC) [I48.0]    Chief Compliant: Follow up for PAF     Subjective:   Patient's status has been stable overnight. He has ambulated and voided with no issues. No chest pain, sob, edema, palps.       Objective:     Vitals:    04/17/21 0300 04/17/21 0500 04/17/21 0542 04/17/21 0841   BP:   114/72    BP Location:   Right arm    Patient Position:   Lying    Pulse: 70 70 81 76   Resp:   16    Temp:   97.8 °F (36.6 °C)    TempSrc:   Oral    SpO2:       Weight:       Height:           Physical Exam:  General-Well Nourished, Well developed  Eyes - PERRLA  Neck- supple, No mass  CV- regular rate and rhythm, no MRG  Lung- clear bilaterally  Abd- soft, +BS  Musc/skel - Norm strength and range of motion; bilateral femoral access sites CDI with no hematoma.   Skin- warm and dry  Neuro - Alert & Oriented x 3, appropriate mood.      Current Facility-Administered Medications:   •  acetaminophen (TYLENOL) tablet 650 mg, 650 mg, Oral, Q4H PRN **OR** acetaminophen (TYLENOL) suppository 650 mg, 650 mg, Rectal, Q4H PRN, Varun Reyna MD  •  atorvastatin (LIPITOR) tablet 20 mg, 20 mg, Oral, Nightly, Varun Reyna MD, 20 mg at 04/16/21 1952  •  HYDROcodone-acetaminophen (NORCO) 5-325 MG per tablet 1 tablet, 1 tablet, Oral, Q4H PRN, Varun Reyna MD  •  ketorolac (TORADOL) injection 15 mg, 15 mg, Intravenous, Q8H, Varun Reyna MD, 15 mg at 04/17/21 0553  •  nitroglycerin (NITROSTAT) SL tablet 0.4 mg, 0.4 mg, Sublingual, Q5 Min PRN, Varun Reyna MD  •  ondansetron (ZOFRAN) injection 4 mg, 4 mg, Intravenous, Q6H PRN, Varun Reyna MD  •  pantoprazole (PROTONIX) EC tablet 40 mg, 40 mg, Oral, Q AM, Varun Reyna MD, 40 mg at 04/17/21 0553  •  sotalol (BETAPACE) tablet 80 mg, 80 mg, Oral, Q12H, Varun Reyna MD, 80 mg at 04/17/21 0841  •  sucralfate (CARAFATE) 1 g Slurry, 1 g, Oral, 4x  Daily AC & at Bedtime, Varun Reyna MD, 1 g at 04/17/21 0553    Data Review:   Recent Results (from the past 24 hour(s))   POC Activated Clotting Time    Collection Time: 04/16/21  8:56 AM    Specimen: Blood   Result Value Ref Range    Activated Clotting Time  120 82 - 152 Seconds   POC Activated Clotting Time    Collection Time: 04/16/21  9:16 AM    Specimen: Blood   Result Value Ref Range    Activated Clotting Time  296 (H) 82 - 152 Seconds   POC Activated Clotting Time    Collection Time: 04/16/21  9:42 AM    Specimen: Blood   Result Value Ref Range    Activated Clotting Time  329 (H) 82 - 152 Seconds   POC Activated Clotting Time    Collection Time: 04/16/21 10:17 AM    Specimen: Blood   Result Value Ref Range    Activated Clotting Time  340 (H) 82 - 152 Seconds   POC Activated Clotting Time    Collection Time: 04/16/21 10:52 AM    Specimen: Blood   Result Value Ref Range    Activated Clotting Time  312 (H) 82 - 152 Seconds   ECG 12 Lead    Collection Time: 04/16/21  6:52 PM   Result Value Ref Range    QT Interval 426 ms    QTC Interval 460 ms   ECG 12 Lead    Collection Time: 04/16/21 11:03 PM   Result Value Ref Range    QT Interval 448 ms    QTC Interval 483 ms   ECG 12 Lead    Collection Time: 04/17/21  5:35 AM   Result Value Ref Range    QT Interval 392 ms    QTC Interval 457 ms     Assessment:       Paroxysmal A-fib (CMS/HCC)    Plan:   1. Symptomatic Persistent AFib   - s/p pulmonary vein ablation 4/16 with Dr. Reyna   -Echo, 3/16/21, EF 42%, The following left ventricular wall segments are hypokinetic: basal inferolateral, mid inferolateral, apical inferior, mid inferior, apical septal and basal inferior.  -Anticoagulation: [id number] score of 2-continue Xarelto  - started Sotalol 80mg BID post ablation. EKG this AM with stable QTc   - discussed with Dr. Reyna and patient is stable for discharge. He was provided with an order for EKG on Monday 4/19/21.     2. Tachybrady Syndrome   - s/p  BSC DDD PPM. Stable function.     Patient is in stable condition and will be discharged home. Follow-up in Afib clinic in one month and Dr. Reyna in 3 months.     Electronically signed by TOMER Almaraz, 04/17/21, 8:58 AM EDT.

## 2021-04-19 LAB
QT INTERVAL: 392 MS
QT INTERVAL: 426 MS
QT INTERVAL: 426 MS
QTC INTERVAL: 457 MS
QTC INTERVAL: 460 MS
QTC INTERVAL: 460 MS

## 2021-04-21 LAB
QT INTERVAL: 448 MS
QTC INTERVAL: 483 MS

## 2021-04-22 ENCOUNTER — TELEPHONE (OUTPATIENT)
Dept: CARDIOLOGY | Facility: CLINIC | Age: 76
End: 2021-04-22

## 2021-04-22 NOTE — TELEPHONE ENCOUNTER
I spoke with the patient. He had his f/u EKG done on Monday at Dr. Lopez's office. I called and requested the EKG.

## 2021-04-29 ENCOUNTER — DOCUMENTATION (OUTPATIENT)
Dept: NUTRITION | Facility: HOSPITAL | Age: 76
End: 2021-04-29

## 2021-04-29 NOTE — CONSULTS
"Adult Outpatient Nutrition  Assessment/PES    Patient Name:  Benoit Pham  YOB: 1945  MRN: [id number]    Assessment Date:  4/29/2021    Comments:  RD called patient to initiate appointment that was scheduled on 4/29/21 at 10:00 am. Patient presented with confusion regarding appointment stating \"oh well I didn't think this was happening, it's been moved around and rescheduled.\" Per chart documentation no changes have been made to appointment since it was scheduled on 3/30/21. RD asked patient if he would like to proceed. He informed \"well I'm not really sure what we can accomplish, that stuff you sent me didn't make any sense.\" RD asked if patient was referring to intake assessment packet that was mailed when appointment was scheduled. Patient stated \"yes.\" RD informed that the purpose of the packet was to help expedite the appointment by providing the provider a detailed account of health history as well as typical dietary intake to assist in the development of an individualized eating plan. Patient inquired about health literacy assessment and RD informed that this is to determine patient's literacy with health information as well as reading food labels. Patient stated \"I have a Ph.D, I don't think that is my problem.\" RD explained that not all patient's have high education/literacy levels which is why it is assessed for each patient. RD asked again if patient would like to proceed with scheduled appointment or if he would like to reschedule as he did not appear to be a position to be receptive to nutrition counseling. Patient then stated that he did not feel appointment would be effective as the RD was not listening to \"anything I have to say.\" RD offered to schedule with a different provider and patient declined, stating \"no but I will be sure and report this\" and hung up call.     Electronically signed by:  Lisa Lincoln RD  04/29/21 10:25 EDT  "

## 2021-05-03 NOTE — CONSULTS
Spoke with patient today, offered to reschedule nutrition appointment or mail educational materials, he requested mailing.  [facility]'s Weight Loss Toolkit packet sent along with name and phone number for questions at patient request.  PROSPER De Jesus, RD, LD, MSEd, BC-ADM, ALANNA

## 2021-07-02 ENCOUNTER — OFFICE VISIT (OUTPATIENT)
Dept: CARDIOLOGY | Facility: CLINIC | Age: 76
End: 2021-07-02

## 2021-07-02 VITALS
HEIGHT: 65 IN | DIASTOLIC BLOOD PRESSURE: 78 MMHG | HEART RATE: 74 BPM | BODY MASS INDEX: 35.82 KG/M2 | SYSTOLIC BLOOD PRESSURE: 110 MMHG | WEIGHT: 215 LBS

## 2021-07-02 DIAGNOSIS — I48.19 ATRIAL FIBRILLATION, PERSISTENT (HCC): Primary | ICD-10-CM

## 2021-07-02 DIAGNOSIS — I49.5 TACHYCARDIA-BRADYCARDIA SYNDROME (HCC): ICD-10-CM

## 2021-07-02 DIAGNOSIS — I42.0 CARDIOMYOPATHY, DILATED (HCC): ICD-10-CM

## 2021-07-02 PROCEDURE — 93000 ELECTROCARDIOGRAM COMPLETE: CPT | Performed by: INTERNAL MEDICINE

## 2021-07-02 PROCEDURE — 99214 OFFICE O/P EST MOD 30 MIN: CPT | Performed by: INTERNAL MEDICINE

## 2021-07-02 PROCEDURE — 93280 PM DEVICE PROGR EVAL DUAL: CPT | Performed by: INTERNAL MEDICINE

## 2021-07-02 NOTE — PROGRESS NOTES
Benoit Pham  1945  [phone]    07/02/2021    [facility] CARDIOLOGY     Referring Provider: No ref. provider found     Mitul Lopez MD  [address] [place], [place] [id number]    Chief Complaint   Patient presents with   • Atrial Fibrillation       Problem List:   1. Persistent Atrial Fibrillation   a. CHADSvasc = 2  b. Echocardiogram 7/19/2013: EF 60-65%, no significant valve abnormalities   c. LHC 11/11/2013: minimal luminal irregularities involving RCA and LAD, normal EF   d. Treated with beta blocker   e. ECV with ERAF 3/16/21  f. Echo, 3/16/21, EF 42%, The following left ventricular wall segments are hypokinetic: basal inferolateral, mid inferolateral, apical inferior, mid inferior, apical septal and basal inferior.  g. Pulmonary vein ablation with cath ablation of right atrial isthmus dependent flutter 4/16/2021  2. Sick Sinus Syndrome  a. Dual Chamber [employer]  PPM 7/2013  b. Echo, 3/16/21, EF 42%, The following left ventricular wall segments are hypokinetic: basal inferolateral, mid inferolateral, apical inferior, mid inferior, apical septal and basal inferior.     3. SVT  a. S/p RFA AVnRT in [place] in 2011  4. Tobacco use - resolved  5. DJD  6. GERD  7. Hiatal Hernia  8. TIANA - on CPAP  9. Asthma/COPD  10. Anxiety/Depression - with previous suicide attempt  11. BPH  12. DJD  13. Diverticulosis  14. Surgical History:  a. Tonsillectomy  b. Right total hip replacement     Allergies  Allergies   Allergen Reactions   • Penicillins Hives   • Nuts Mental Status Change     WALNUTS - BECOMES VERY SLEEPY       Current Medications    Current Outpatient Medications:   •  acetaminophen (TYLENOL) 500 MG tablet, Take 500 mg by mouth Every 6 (Six) Hours As Needed for Mild Pain ., Disp: , Rfl:   •  atorvastatin (LIPITOR) 20 MG tablet, Take 20 mg by mouth Every Night., Disp: , Rfl:   •  cholecalciferol (VITAMIN D3) 25 MCG (1000 UT) tablet, Take 1,000 Units by mouth Daily.,  Disp: , Rfl:   •  cycloSPORINE (RESTASIS) 0.05 % ophthalmic emulsion, Administer 1 drop to both eyes 2 (Two) Times a Day., Disp: , Rfl:   •  glucosamine-chondroitin 500-400 MG capsule capsule, Take 1 capsule by mouth 2 (Two) Times a Day With Meals., Disp: , Rfl:   •  ibuprofen (ADVIL,MOTRIN) 800 MG tablet, Take 800 mg by mouth Every 8 (Eight) Hours As Needed., Disp: , Rfl:   •  Magnesium Cl-Calcium Carbonate (SLOW-MAG PO), Take 1 tablet by mouth 2 (two) times a day., Disp: , Rfl:   •  multivitamin with minerals tablet tablet, Take 1 tablet by mouth Daily., Disp: , Rfl:   •  nitroglycerin (NITROSTAT) 0.4 MG SL tablet, Place 0.4 mg under the tongue Every 5 (Five) Minutes As Needed for Chest Pain. Take no more than 3 doses in 15 minutes., Disp: , Rfl:   •  pantoprazole (PROTONIX) 40 MG EC tablet, Take 1 tablet by mouth Daily., Disp: 30 tablet, Rfl: 6  •  Respiratory Therapy Supplies (CareTouch CPAP & BIPAP Hose) misc, , Disp: , Rfl:   •  rivaroxaban (XARELTO) 20 MG tablet, Take 20 mg by mouth Daily., Disp: , Rfl:   •  sotalol (BETAPACE) 80 MG tablet, Take 1 tablet by mouth Every 12 (Twelve) Hours., Disp: 60 tablet, Rfl: 6  •  vitamin B-12 (CYANOCOBALAMIN) 1000 MCG tablet, Take 1,000 mcg by mouth Daily., Disp: , Rfl:     History of Present Illness     Pt presents for follow up of AF/SSS. Since we last saw the pt, pt underwent pulmonary vein ablation right-sided atrial flutter in March 2021.  Since that time he feels overall significantly improved.  He is unaware of any tachypalpitations.  He denies any substantial chest pain, lightheadedness or near syncope.  He does have shortness of breath which seems to be persistent but better as well.  Denies any hospitalizations, ER visits, bleeding, or TIA/CVA symptoms. Overall feels improved.    ROS:  General:  + fatigue, no weight gain or loss  Cardiovascular:  Denies CP, PND, syncope, near syncope, edema or palpitations.  Pulmonary:  + TRAN, No cough, or wheezing      Vitals:  "   07/02/21 1442   BP: 110/78   BP Location: Left arm   Patient Position: Sitting   Pulse: 74   Weight: 97.5 kg (215 lb)   Height: 165.1 cm (65\")     Body mass index is 35.78 kg/m².  PE:  General: NAD  Neck: no JVD, no carotid bruits, no TM  Heart RRR, NL S1, S2, S4 present, no rubs, murmurs  Lungs: CTA, no wheezes, rhonchi, or rales  Abd: soft, non-tender, NL BS  Ext: No musculoskeletal deformities, no edema, cyanosis, or clubbing  Psych: normal mood and affect    Diagnostic Data:    Pacemaker interrogation demonstrates normal pacemaker function.  8 months left on the battery.  Right atrial paced 21% of time.  RV paced 29% of time.  40% atrial fibrillation, review of the AT/AF burden demonstrates less than 20% A. fib over the past 4 weeks.      ECG 12 Lead    Date/Time: 7/2/2021 3:03 PM  Performed by: Varun Reyna MD  Authorized by: Varun Reyna MD   Comparison: compared with previous ECG from 4/19/2021  Comparison to previous ECG: Now with native AV conduction  Rhythm: sinus rhythm and paced  BPM: 74              1. Atrial fibrillation, persistent (CMS/HCC)    2. Tachycardia-bradycardia syndrome (CMS/HCC)    3. Cardiomyopathy, dilated (CMS/HCC)          Plan:  1) Symptomatic persistent atrial fibrillation status post pulmonary vein ablation with right atrial isthmus dependent flutter on 4/16/2021.  Now 10 weeks post ablation with paroxysmal atrial fibrillation with adequate ventricular rate control approximately 20% over the past month.  Overall the patient has significant improvement of his symptomatology.  Would continue both low-dose sotalol as well as Xarelto for now.    2) Tachycardia-bradycardia syndrome s/p [employer] dual-chamber pacemaker    3) DCM Echo, 3/16/21, EF 42%; consider repeating echocardiogram and repeat in 6 months with Dr. Olvera.    4) Anticoagulation: [id number] score of 2-continue Xarelto        F/up in 3 months      "

## 2021-10-01 ENCOUNTER — OFFICE VISIT (OUTPATIENT)
Dept: CARDIOLOGY | Facility: CLINIC | Age: 76
End: 2021-10-01

## 2021-10-01 VITALS
TEMPERATURE: 96.3 F | DIASTOLIC BLOOD PRESSURE: 70 MMHG | HEIGHT: 65 IN | OXYGEN SATURATION: 98 % | SYSTOLIC BLOOD PRESSURE: 119 MMHG | WEIGHT: 217 LBS | HEART RATE: 78 BPM | BODY MASS INDEX: 36.15 KG/M2

## 2021-10-01 DIAGNOSIS — I42.0 CARDIOMYOPATHY, DILATED (HCC): ICD-10-CM

## 2021-10-01 DIAGNOSIS — I49.5 SSS (SICK SINUS SYNDROME) (HCC): ICD-10-CM

## 2021-10-01 DIAGNOSIS — I48.4 ATYPICAL ATRIAL FLUTTER (HCC): Primary | ICD-10-CM

## 2021-10-01 DIAGNOSIS — I10 ESSENTIAL HYPERTENSION: ICD-10-CM

## 2021-10-01 PROCEDURE — 93280 PM DEVICE PROGR EVAL DUAL: CPT | Performed by: INTERNAL MEDICINE

## 2021-10-01 PROCEDURE — 99213 OFFICE O/P EST LOW 20 MIN: CPT | Performed by: INTERNAL MEDICINE

## 2021-10-01 RX ORDER — OMEPRAZOLE 20 MG/1
20 CAPSULE, DELAYED RELEASE ORAL DAILY
COMMUNITY
End: 2022-04-06 | Stop reason: ALTCHOICE

## 2021-10-01 RX ORDER — GABAPENTIN 100 MG/1
100 CAPSULE ORAL 3 TIMES DAILY
COMMUNITY
End: 2022-04-06

## 2021-10-01 RX ORDER — DOCUSATE SODIUM 100 MG/1
100 CAPSULE, LIQUID FILLED ORAL 2 TIMES DAILY
COMMUNITY
End: 2022-04-06 | Stop reason: ALTCHOICE

## 2021-10-01 RX ORDER — TRAMADOL HYDROCHLORIDE 50 MG/1
50 TABLET ORAL EVERY 6 HOURS PRN
COMMUNITY
End: 2022-04-06

## 2021-10-01 RX ORDER — OXYCODONE AND ACETAMINOPHEN 10; 325 MG/1; MG/1
1 TABLET ORAL EVERY 6 HOURS PRN
COMMUNITY
End: 2022-04-06

## 2021-10-01 RX ORDER — MELOXICAM 15 MG/1
15 TABLET ORAL DAILY
COMMUNITY
End: 2022-04-06

## 2021-10-01 NOTE — PROGRESS NOTES
Benoit Pham  1945  [phone]    10/01/2021    [facility] CARDIOLOGY     Referring Provider: No ref. provider found     Mitul Lopez MD  [address] [place], [place] [id number]    Chief Complaint   Patient presents with   • PERSISTENT ATRIAL FIBRILLATION       Problem List:   1. Persistent Atrial Fibrillation   a. CHADSvasc = 2  b. Echocardiogram 7/19/2013: EF 60-65%, no significant valve abnormalities   c. LHC 11/11/2013: minimal luminal irregularities involving RCA and LAD, normal EF   d. Treated with beta blocker   e. ECV with ERAF 3/16/21  f. Echo, 3/16/21, EF 42%, The following left ventricular wall segments are hypokinetic: basal inferolateral, mid inferolateral, apical inferior, mid inferior, apical septal and basal inferior.  g. Pulmonary vein ablation with cath ablation of right atrial isthmus dependent flutter 4/16/2021  2. Sick Sinus Syndrome  a. Dual Chamber [employer]  PPM 7/2013  b. Echo, 3/16/21, EF 42%, The following left ventricular wall segments are hypokinetic: basal inferolateral, mid inferolateral, apical inferior, mid inferior, apical septal and basal inferior.     3. SVT  a. S/p RFA AVnRT in [place] in 2011  4. Tobacco use - resolved  5. DJD  6. GERD  7. Hiatal Hernia  8. TIANA - on CPAP  9. Asthma/COPD  10. Anxiety/Depression - with previous suicide attempt  11. BPH  12. DJD  13. Diverticulosis  14. Surgical History:  a. Tonsillectomy  b. Right total hip replacement    Allergies  Allergies   Allergen Reactions   • Penicillins Hives   • Nuts Mental Status Change     WALNUTS - BECOMES VERY SLEEPY       Current Medications    Current Outpatient Medications:   •  acetaminophen (TYLENOL) 500 MG tablet, Take 500 mg by mouth Every 6 (Six) Hours As Needed for Mild Pain ., Disp: , Rfl:   •  atorvastatin (LIPITOR) 20 MG tablet, Take 20 mg by mouth Every Night., Disp: , Rfl:   •  calcium citrate-vitamin d (CITRACAL) 200-250 MG-UNIT tablet tablet, Take  by mouth  Daily., Disp: , Rfl:   •  cholecalciferol (VITAMIN D3) 25 MCG (1000 UT) tablet, Take 1,000 Units by mouth Daily., Disp: , Rfl:   •  cycloSPORINE (RESTASIS) 0.05 % ophthalmic emulsion, Administer 1 drop to both eyes 2 (Two) Times a Day., Disp: , Rfl:   •  docusate sodium (COLACE) 100 MG capsule, Take 100 mg by mouth 2 (Two) Times a Day., Disp: , Rfl:   •  gabapentin (NEURONTIN) 100 MG capsule, Take 100 mg by mouth 3 (Three) Times a Day., Disp: , Rfl:   •  glucosamine-chondroitin 500-400 MG capsule capsule, Take 1 capsule by mouth 2 (Two) Times a Day With Meals., Disp: , Rfl:   •  Magnesium Cl-Calcium Carbonate (SLOW-MAG PO), Take 1 tablet by mouth 2 (two) times a day., Disp: , Rfl:   •  meloxicam (MOBIC) 15 MG tablet, Take 15 mg by mouth Daily., Disp: , Rfl:   •  nitroglycerin (NITROSTAT) 0.4 MG SL tablet, Place 0.4 mg under the tongue Every 5 (Five) Minutes As Needed for Chest Pain. Take no more than 3 doses in 15 minutes., Disp: , Rfl:   •  omeprazole (priLOSEC) 20 MG capsule, Take 20 mg by mouth Daily., Disp: , Rfl:   •  oxyCODONE-acetaminophen (PERCOCET)  MG per tablet, Take 1 tablet by mouth Every 6 (Six) Hours As Needed for Moderate Pain ., Disp: , Rfl:   •  pantoprazole (PROTONIX) 40 MG EC tablet, Take 1 tablet by mouth Daily., Disp: 30 tablet, Rfl: 6  •  Respiratory Therapy Supplies (CareTouch CPAP & BIPAP Hose) misc, , Disp: , Rfl:   •  rivaroxaban (XARELTO) 20 MG tablet, Take 20 mg by mouth Daily., Disp: , Rfl:   •  sotalol (BETAPACE) 80 MG tablet, Take 1 tablet by mouth Every 12 (Twelve) Hours., Disp: 60 tablet, Rfl: 6  •  traMADol (ULTRAM) 50 MG tablet, Take 50 mg by mouth Every 6 (Six) Hours As Needed for Moderate Pain ., Disp: , Rfl:   •  vitamin B-12 (CYANOCOBALAMIN) 1000 MCG tablet, Take 1,000 mcg by mouth Daily., Disp: , Rfl:   •  ibuprofen (ADVIL,MOTRIN) 800 MG tablet, Take 800 mg by mouth Every 8 (Eight) Hours As Needed., Disp: , Rfl:   •  multivitamin with minerals tablet tablet, Take 1  "tablet by mouth Daily., Disp: , Rfl:     History of Present Illness     Pt presents for follow up of AF/SSS/SVT. Since we last saw the pt, pt underwent hip replacement 10 days ago.  He is slowly improving from that standpoint.  He has felt intermittent shortness of breath since we last saw him.  No chest pain, lightheadedness, near syncope or syncope.  He is unaware of any tachypalpitations per se.  He has been compliant with his medical therapy.   No bleeding, or TIA/CVA symptoms. Overall feels slightly fatigued more often on certain days compared to other days.  Blood pressures at home have been stable.    ROS:  General:  + fatigue, No weight gain or loss  Cardiovascular:  Denies CP, PND, syncope, near syncope, edema or palpitations.  Pulmonary:  Denies TRAN, cough, or wheezing      Vitals:    10/01/21 1426   BP: 119/70   BP Location: Left arm   Patient Position: Sitting   Pulse: 78   Temp: 96.3 °F (35.7 °C)   SpO2: 98%   Weight: 98.4 kg (217 lb)   Height: 165.1 cm (65\")     Body mass index is 36.11 kg/m².  PE:  General: NAD  Neck: no JVD, no carotid bruits, no TM  Heart irregular irregular rate and rhythm NL S1, S2, no rubs, murmurs  Lungs: CTA, no wheezes, rhonchi, or rales  Abd: soft, non-tender, NL BS  Ext: No musculoskeletal deformities, no edema, cyanosis, or clubbing  Psych: normal mood and affect    Diagnostic Data:        ECG 12 Lead    Date/Time: 10/1/2021 2:41 PM  Performed by: Varun Reyna MD  Authorized by: Varun Reyna MD   Comparison: compared with previous ECG from 7/2/2021  Comparison to previous ECG: Now in atypical atrial flutter  Rhythm: atrial flutter and paced  BPM: 78              1. Atypical atrial flutter (HCC)    2. SSS (sick sinus syndrome) (HCC)    3. Cardiomyopathy, dilated (HCC)    4. Essential hypertension        Pacemaker interrogation demonstrates normal pacemaker function.   4 months to DUNCAN left on the battery.    3 1% right atrial paced.  RV paced 25% of time. 25 % " atrial flutter    Plan:  1) Symptomatic persistent atrial fibrillation status post pulmonary vein ablation with right atrial isthmus dependent flutter on 4/16/2021.   Now with 25% paroxysmal atypical atrial flutter on low-dose sotalol.  We may need to pursue repeat pulmonary vein ablation for his atypical atrial flutter versus consideration for Tikosyn.  At this point I would allow him to recover from his recent hip surgery and see him back in 3 months for reassessment.       2) Tachycardia-bradycardia syndrome s/p [employer] dual-chamber pacemaker; 4 months to DUNCAN left on battery.  Most likely will need pacemaker generator change out in 6 months.     3) DCM Echo, 3/16/21, EF 42%;  will repeat echocardiogram through Dr. Olvera's office to reassess LVEF.     4) Anticoagulation: [id number] score of 2-continue Xarelto     F/up in 3 months

## 2021-10-04 DIAGNOSIS — I42.0 CARDIOMYOPATHY, DILATED (HCC): Primary | ICD-10-CM

## 2022-04-06 ENCOUNTER — OFFICE VISIT (OUTPATIENT)
Dept: CARDIOLOGY | Facility: CLINIC | Age: 77
End: 2022-04-06

## 2022-04-06 VITALS
OXYGEN SATURATION: 99 % | HEART RATE: 89 BPM | BODY MASS INDEX: 36.15 KG/M2 | WEIGHT: 217 LBS | HEIGHT: 65 IN | DIASTOLIC BLOOD PRESSURE: 70 MMHG | SYSTOLIC BLOOD PRESSURE: 138 MMHG

## 2022-04-06 DIAGNOSIS — I42.0 CARDIOMYOPATHY, DILATED: ICD-10-CM

## 2022-04-06 DIAGNOSIS — I10 ESSENTIAL HYPERTENSION: ICD-10-CM

## 2022-04-06 DIAGNOSIS — Z45.018 ELECTIVE REPLACEMENT INDICATED FOR PACEMAKER: ICD-10-CM

## 2022-04-06 DIAGNOSIS — I48.19 ATRIAL FIBRILLATION, PERSISTENT: Primary | ICD-10-CM

## 2022-04-06 DIAGNOSIS — I49.5 SSS (SICK SINUS SYNDROME): ICD-10-CM

## 2022-04-06 PROCEDURE — 93280 PM DEVICE PROGR EVAL DUAL: CPT | Performed by: INTERNAL MEDICINE

## 2022-04-06 PROCEDURE — 93000 ELECTROCARDIOGRAM COMPLETE: CPT | Performed by: INTERNAL MEDICINE

## 2022-04-06 PROCEDURE — 99214 OFFICE O/P EST MOD 30 MIN: CPT | Performed by: INTERNAL MEDICINE

## 2022-04-06 RX ORDER — HYDROXYZINE HYDROCHLORIDE 25 MG/1
25 TABLET, FILM COATED ORAL EVERY 8 HOURS PRN
COMMUNITY
Start: 2022-01-19

## 2022-04-06 NOTE — PROGRESS NOTES
Benoit Pham  1945  [phone]    04/06/2022    [facility] CARDIOLOGY     Referring Provider: No ref. provider found     Mitul Lopez MD  [address] [place], [place] [id number]    Chief Complaint   Patient presents with   • Atypical atrial flutter       Problem List:     1. Persistent Atrial Fibrillation   a. CHADSvasc = 2  b. Echocardiogram 7/19/2013: EF 60-65%, no significant valve abnormalities   c. LHC 11/11/2013: minimal luminal irregularities involving RCA and LAD, normal EF   d. Treated with beta blocker   e. ECV with ERAF 3/16/21  f. Echo, 3/16/21, EF 42%, The following left ventricular wall segments are hypokinetic: basal inferolateral, mid inferolateral, apical inferior, mid inferior, apical septal and basal inferior.  g. Pulmonary vein ablation with cath ablation of right atrial isthmus dependent flutter 4/16/2021  2. Sick Sinus Syndrome  a. Dual Chamber [employer]  PPM 7/2013  b. Echo, 3/16/21, EF 42%, The following left ventricular wall segments are hypokinetic: basal inferolateral, mid inferolateral, apical inferior, mid inferior, apical septal and basal inferior.     3. SVT  a. S/p RFA AVnRT in [place] in 2011  4. Tobacco use - resolved  5. DJD  6. GERD  7. Hiatal Hernia  8. TIANA - on CPAP  9. Asthma/COPD  10. Anxiety/Depression - with previous suicide attempt  11. BPH  12. DJD  13. Diverticulosis  14. Surgical History:  a. Tonsillectomy  b. Right total hip replacement    Allergies  Allergies   Allergen Reactions   • Penicillins Hives   • Nuts Mental Status Change     WALNUTS - BECOMES VERY SLEEPY       Current Medications    Current Outpatient Medications:   •  acetaminophen (TYLENOL) 500 MG tablet, Take 500 mg by mouth Every 6 (Six) Hours As Needed for Mild Pain ., Disp: , Rfl:   •  calcium citrate-vitamin d (CITRACAL) 200-250 MG-UNIT tablet tablet, Take  by mouth Daily., Disp: , Rfl:   •  cholecalciferol (VITAMIN D3) 25 MCG (1000 UT) tablet, Take 1,000  Units by mouth Daily., Disp: , Rfl:   •  cycloSPORINE (RESTASIS) 0.05 % ophthalmic emulsion, Administer 1 drop to both eyes 2 (Two) Times a Day., Disp: , Rfl:   •  glucosamine-chondroitin 500-400 MG capsule capsule, Take 1 capsule by mouth 2 (Two) Times a Day With Meals., Disp: , Rfl:   •  hydrOXYzine (ATARAX) 25 MG tablet, , Disp: , Rfl:   •  ibuprofen (ADVIL,MOTRIN) 800 MG tablet, Take 800 mg by mouth Every 8 (Eight) Hours As Needed., Disp: , Rfl:   •  Magnesium Cl-Calcium Carbonate (SLOW-MAG PO), Take 1 tablet by mouth 2 (two) times a day., Disp: , Rfl:   •  multivitamin with minerals tablet tablet, Take 1 tablet by mouth Daily., Disp: , Rfl:   •  nitroglycerin (NITROSTAT) 0.4 MG SL tablet, Place 0.4 mg under the tongue Every 5 (Five) Minutes As Needed for Chest Pain. Take no more than 3 doses in 15 minutes., Disp: , Rfl:   •  vitamin B-12 (CYANOCOBALAMIN) 1000 MCG tablet, Take 1,000 mcg by mouth Daily., Disp: , Rfl:   •  atorvastatin (LIPITOR) 20 MG tablet, Take 20 mg by mouth Every Night., Disp: , Rfl:   •  docusate sodium (COLACE) 100 MG capsule, Take 100 mg by mouth 2 (Two) Times a Day., Disp: , Rfl:   •  omeprazole (priLOSEC) 20 MG capsule, Take 20 mg by mouth Daily., Disp: , Rfl:   •  pantoprazole (PROTONIX) 40 MG EC tablet, Take 1 tablet by mouth Daily., Disp: 30 tablet, Rfl: 6  •  rivaroxaban (XARELTO) 20 MG tablet, Take 20 mg by mouth Daily., Disp: , Rfl:   •  sotalol (BETAPACE) 80 MG tablet, Take 1 tablet by mouth Every 12 (Twelve) Hours., Disp: 60 tablet, Rfl: 6    History of Present Illness     Pt presents for follow up of AF/SSS. Since we last saw the pt, he has quit taking all of his cardiac medications in February after he saw Dr. Olvera who told him that his PM battery was in need of change and apparently he was in afib. The patient states that he told him there was nothing to do about his afib. He states he feels well with more energy off his medications. He has more energy.He denies SOB, CP,  "LH, and dizziness, syncope. Denies any hospitalizations, ER visits, bleeding issues, or TIA/CVA symptoms.     ROS:  General:  Denies fatigue, weight gain or loss  Cardiovascular:  Denies CP, PND, syncope, near syncope, edema or palpitations.  Pulmonary:  Denies TRAN, cough, or wheezing      Vitals:    04/06/22 1500   BP: 138/70   BP Location: Left arm   Patient Position: Sitting   Pulse: 89   SpO2: 99%   Weight: 98.4 kg (217 lb)   Height: 165.1 cm (65\")     Body mass index is 36.11 kg/m².  PE:  General: NAD  Neck: no JVD, no carotid bruits, no TM  Heart RRR, NL S1, S2, S4 present, no rubs, murmurs  Lungs: CTA, no wheezes, rhonchi, or rales  Abd: soft, non-tender, NL BS  Ext: No musculoskeletal deformities, no edema, cyanosis, or clubbing  Psych: normal mood and affect    Diagnostic Data:    PM Manual Interrogation: 49% AF, 12% RA paced, 19% RV paced      ECG 12 Lead    Date/Time: 4/6/2022 3:27 PM  Performed by: Varun Reyna MD  Authorized by: Varun Reyna MD   Comparison: compared with previous ECG from 10/1/2021  Comparison to previous ECG: Now in NSR   Rhythm: sinus rhythm and paced  Ectopy: atrial premature contractions  BPM: 80              1. Atrial fibrillation, persistent (HCC)    2. SSS (sick sinus syndrome) (HCC)    3. Essential hypertension    4. Cardiomyopathy, dilated (HCC)          Plan:    1) Symptomatic persistent atrial fibrillation status post pulmonary vein ablation with right atrial isthmus dependent flutter on 4/16/2021. Last interrogation, 10/1/2021 showed 25% atypical atrial flutter after a recent hip surgery.  - was on Sotalol but has self discontinued 2/2022.   - today, in NSR/a paced. PM with 49% AF. Pt states that he had echo with Dr Olvera office in Feb. Will get that result     2) Tachycardia-bradycardia syndrome s/p [employer] dual-chamber pacemaker; reached DUNCAN 1/29/2022.      3) DCM Echo, 3/16/21, EF 42%;      4) Anticoagulation: [id number] score of 2-self " discontinued Xarelto: restart xarelto today       Scribed for Varun Reyna MD by Gabrielle Mustafa PA-C. 4/6/2022  15:43 EDT     I, Varun Reyna MD, personally performed the services described in this documentation as scribed by the above named individual in my presence, and it is both accurate and complete.  4/6/2022  15:43 EDT

## 2022-04-15 ENCOUNTER — DOCUMENTATION (OUTPATIENT)
Dept: CARDIOLOGY | Facility: CLINIC | Age: 77
End: 2022-04-15

## 2022-04-15 NOTE — PROGRESS NOTES
Updated Problem List:    Problem List:      1. Persistent Atrial Fibrillation   a. CHADSvasc = 2  b. Echocardiogram 7/19/2013: EF 60-65%, no significant valve abnormalities   c. LHC 11/11/2013: minimal luminal irregularities involving RCA and LAD, normal EF   d. Treated with beta blocker   e. ECV with ERAF 3/16/21  f. Echo, 3/16/21, EF 42%, The following left ventricular wall segments are hypokinetic: basal inferolateral, mid inferolateral, apical inferior, mid inferior, apical septal and basal inferior.  g. Pulmonary vein ablation with cath ablation of right atrial isthmus dependent flutter 4/16/2021  h. Echocardiogram 10/15/2021: EF 50%, mild AS/AR, mild LAE  2. Sick Sinus Syndrome  a. Dual Chamber [employer]  PPM 7/2013  b. Echo, 3/16/21, EF 42%, The following left ventricular wall segments are hypokinetic: basal inferolateral, mid inferolateral, apical inferior, mid inferior, apical septal and basal inferior.     3. SVT  a. S/p RFA AVnRT in [place] in 2011  4. Tobacco use - resolved  5. DJD  6. GERD  7. Hiatal Hernia  8. TIANA - on CPAP  9. Asthma/COPD  10. Anxiety/Depression - with previous suicide attempt  11. BPH  12. DJD  13. Diverticulosis  14. Surgical History:  a. Tonsillectomy  b. Right total hip replacement

## 2022-04-19 ENCOUNTER — PREP FOR SURGERY (OUTPATIENT)
Dept: OTHER | Facility: HOSPITAL | Age: 77
End: 2022-04-19

## 2022-04-19 DIAGNOSIS — Z45.018 ELECTIVE REPLACEMENT INDICATED FOR PACEMAKER: Primary | ICD-10-CM

## 2022-04-19 DIAGNOSIS — I49.5 SICK SINUS SYNDROME: ICD-10-CM

## 2022-04-19 RX ORDER — NITROGLYCERIN 0.4 MG/1
0.4 TABLET SUBLINGUAL
Status: CANCELLED | OUTPATIENT
Start: 2022-04-19

## 2022-04-19 RX ORDER — SODIUM CHLORIDE 9 MG/ML
1 INJECTION, SOLUTION INTRAVENOUS CONTINUOUS
Status: CANCELLED | OUTPATIENT
Start: 2022-04-19 | End: 2022-04-19

## 2022-04-19 RX ORDER — SODIUM CHLORIDE 0.9 % (FLUSH) 0.9 %
10 SYRINGE (ML) INJECTION AS NEEDED
Status: CANCELLED | OUTPATIENT
Start: 2022-04-19

## 2022-04-19 RX ORDER — SODIUM CHLORIDE 0.9 % (FLUSH) 0.9 %
3 SYRINGE (ML) INJECTION EVERY 12 HOURS SCHEDULED
Status: CANCELLED | OUTPATIENT
Start: 2022-04-19

## 2022-04-19 RX ORDER — ACETAMINOPHEN 325 MG/1
650 TABLET ORAL EVERY 4 HOURS PRN
Status: CANCELLED | OUTPATIENT
Start: 2022-04-19

## 2022-05-09 ENCOUNTER — HOSPITAL ENCOUNTER (OUTPATIENT)
Facility: HOSPITAL | Age: 77
Setting detail: HOSPITAL OUTPATIENT SURGERY
Discharge: HOME | End: 2022-05-09
Attending: INTERNAL MEDICINE | Admitting: INTERNAL MEDICINE

## 2022-05-09 VITALS
RESPIRATION RATE: 16 BRPM | HEART RATE: 70 BPM | DIASTOLIC BLOOD PRESSURE: 90 MMHG | HEIGHT: 65 IN | OXYGEN SATURATION: 95 % | BODY MASS INDEX: 36.19 KG/M2 | WEIGHT: 217.2 LBS | TEMPERATURE: 97.5 F | SYSTOLIC BLOOD PRESSURE: 157 MMHG

## 2022-05-09 DIAGNOSIS — Z45.018 ELECTIVE REPLACEMENT INDICATED FOR PACEMAKER: ICD-10-CM

## 2022-05-09 LAB
ANION GAP SERPL CALCULATED.3IONS-SCNC: 10 MMOL/L (ref 5–15)
BUN SERPL-MCNC: 18 MG/DL (ref 8–23)
BUN/CREAT SERPL: 24.7 (ref 7–25)
CALCIUM SPEC-SCNC: 9 MG/DL (ref 8.6–10.5)
CHLORIDE SERPL-SCNC: 100 MMOL/L (ref 98–107)
CO2 SERPL-SCNC: 28 MMOL/L (ref 22–29)
CREAT SERPL-MCNC: 0.73 MG/DL (ref 0.76–1.27)
DEPRECATED RDW RBC AUTO: 46.6 FL (ref 37–54)
EGFRCR SERPLBLD CKD-EPI 2021: 93.7 ML/MIN/1.73
ERYTHROCYTE [DISTWIDTH] IN BLOOD BY AUTOMATED COUNT: 12.8 % (ref 12.3–15.4)
GLUCOSE SERPL-MCNC: 101 MG/DL (ref 65–99)
HBA1C MFR BLD: 4.5 % (ref 4.8–5.6)
HCT VFR BLD AUTO: 38 % (ref 37.5–51)
HGB BLD-MCNC: 13.8 G/DL (ref 13–17.7)
MCH RBC QN AUTO: 36.2 PG (ref 26.6–33)
MCHC RBC AUTO-ENTMCNC: 36.3 G/DL (ref 31.5–35.7)
MCV RBC AUTO: 99.7 FL (ref 79–97)
PLATELET # BLD AUTO: 237 10*3/MM3 (ref 140–450)
PMV BLD AUTO: 10.2 FL (ref 6–12)
POTASSIUM SERPL-SCNC: 4 MMOL/L (ref 3.5–5.2)
RBC # BLD AUTO: 3.81 10*6/MM3 (ref 4.14–5.8)
SODIUM SERPL-SCNC: 138 MMOL/L (ref 136–145)
WBC NRBC COR # BLD: 8.03 10*3/MM3 (ref 3.4–10.8)

## 2022-05-09 PROCEDURE — 0 LIDOCAINE 1 % SOLUTION: Performed by: INTERNAL MEDICINE

## 2022-05-09 PROCEDURE — 33228 REMV&REPLC PM GEN DUAL LEAD: CPT | Performed by: INTERNAL MEDICINE

## 2022-05-09 PROCEDURE — 25010000002 MIDAZOLAM PER 1 MG: Performed by: INTERNAL MEDICINE

## 2022-05-09 PROCEDURE — 99152 MOD SED SAME PHYS/QHP 5/>YRS: CPT | Performed by: INTERNAL MEDICINE

## 2022-05-09 PROCEDURE — 25010000002 FENTANYL CITRATE (PF) 50 MCG/ML SOLUTION: Performed by: INTERNAL MEDICINE

## 2022-05-09 PROCEDURE — 85027 COMPLETE CBC AUTOMATED: CPT | Performed by: INTERNAL MEDICINE

## 2022-05-09 PROCEDURE — 25010000002 VANCOMYCIN: Performed by: NURSE PRACTITIONER

## 2022-05-09 PROCEDURE — 25010000002 ONDANSETRON PER 1 MG: Performed by: INTERNAL MEDICINE

## 2022-05-09 PROCEDURE — 83036 HEMOGLOBIN GLYCOSYLATED A1C: CPT | Performed by: NURSE PRACTITIONER

## 2022-05-09 PROCEDURE — 99153 MOD SED SAME PHYS/QHP EA: CPT | Performed by: INTERNAL MEDICINE

## 2022-05-09 PROCEDURE — C1785 PMKR, DUAL, RATE-RESP: HCPCS | Performed by: INTERNAL MEDICINE

## 2022-05-09 PROCEDURE — 80048 BASIC METABOLIC PNL TOTAL CA: CPT | Performed by: INTERNAL MEDICINE

## 2022-05-09 DEVICE — PACEMAKER
Type: IMPLANTABLE DEVICE | Status: FUNCTIONAL
Brand: ACCOLADE™ MRI DR

## 2022-05-09 RX ORDER — LIDOCAINE HYDROCHLORIDE 10 MG/ML
INJECTION, SOLUTION INFILTRATION; PERINEURAL AS NEEDED
Status: DISCONTINUED | OUTPATIENT
Start: 2022-05-09 | End: 2022-05-09 | Stop reason: HOSPADM

## 2022-05-09 RX ORDER — ROSUVASTATIN CALCIUM 10 MG/1
10 TABLET, COATED ORAL DAILY
COMMUNITY

## 2022-05-09 RX ORDER — ONDANSETRON 2 MG/ML
INJECTION INTRAMUSCULAR; INTRAVENOUS AS NEEDED
Status: DISCONTINUED | OUTPATIENT
Start: 2022-05-09 | End: 2022-05-09 | Stop reason: HOSPADM

## 2022-05-09 RX ORDER — SODIUM CHLORIDE 9 MG/ML
1 INJECTION, SOLUTION INTRAVENOUS CONTINUOUS
Status: ACTIVE | OUTPATIENT
Start: 2022-05-09 | End: 2022-05-09

## 2022-05-09 RX ORDER — FENTANYL CITRATE 50 UG/ML
INJECTION, SOLUTION INTRAMUSCULAR; INTRAVENOUS AS NEEDED
Status: DISCONTINUED | OUTPATIENT
Start: 2022-05-09 | End: 2022-05-09 | Stop reason: HOSPADM

## 2022-05-09 RX ORDER — ACETAMINOPHEN 325 MG/1
650 TABLET ORAL EVERY 4 HOURS PRN
Status: DISCONTINUED | OUTPATIENT
Start: 2022-05-09 | End: 2022-05-09 | Stop reason: HOSPADM

## 2022-05-09 RX ORDER — BUPIVACAINE HYDROCHLORIDE 5 MG/ML
INJECTION, SOLUTION EPIDURAL; INTRACAUDAL AS NEEDED
Status: DISCONTINUED | OUTPATIENT
Start: 2022-05-09 | End: 2022-05-09 | Stop reason: HOSPADM

## 2022-05-09 RX ORDER — NITROGLYCERIN 0.4 MG/1
0.4 TABLET SUBLINGUAL
Status: DISCONTINUED | OUTPATIENT
Start: 2022-05-09 | End: 2022-05-09 | Stop reason: HOSPADM

## 2022-05-09 RX ORDER — SODIUM CHLORIDE 9 MG/ML
INJECTION, SOLUTION INTRAVENOUS CONTINUOUS PRN
Status: COMPLETED | OUTPATIENT
Start: 2022-05-09 | End: 2022-05-09

## 2022-05-09 RX ORDER — MIDAZOLAM HYDROCHLORIDE 1 MG/ML
INJECTION INTRAMUSCULAR; INTRAVENOUS AS NEEDED
Status: DISCONTINUED | OUTPATIENT
Start: 2022-05-09 | End: 2022-05-09 | Stop reason: HOSPADM

## 2022-05-09 RX ORDER — SODIUM CHLORIDE 0.9 % (FLUSH) 0.9 %
10 SYRINGE (ML) INJECTION AS NEEDED
Status: DISCONTINUED | OUTPATIENT
Start: 2022-05-09 | End: 2022-05-09 | Stop reason: HOSPADM

## 2022-05-09 RX ORDER — SODIUM CHLORIDE 0.9 % (FLUSH) 0.9 %
3 SYRINGE (ML) INJECTION EVERY 12 HOURS SCHEDULED
Status: DISCONTINUED | OUTPATIENT
Start: 2022-05-09 | End: 2022-05-09 | Stop reason: HOSPADM

## 2022-05-09 RX ADMIN — SODIUM CHLORIDE 1 ML/KG/HR: 9 INJECTION, SOLUTION INTRAVENOUS at 07:43

## 2022-05-09 RX ADMIN — VANCOMYCIN HYDROCHLORIDE 1500 MG: 10 INJECTION, POWDER, LYOPHILIZED, FOR SOLUTION INTRAVENOUS at 09:37

## 2022-05-09 NOTE — H&P
Cardiology H&P     Benoit Pham  1945  [phone]  There is no work phone number on file.    05/09/22    DATE OF ADMISSION: 5/9/2022  [facility]  [address] [facility] / [place] [address]  Referring Provider: Varun Reyna MD     CC: Tachy Fercho Syndrome / AF     Problem List:      1. Persistent Atrial Fibrillation   a. CHADSvasc = 2  b. Echocardiogram 7/19/2013: EF 60-65%, no significant valve abnormalities   c. LHC 11/11/2013: minimal luminal irregularities involving RCA and LAD, normal EF   d. Treated with beta blocker   e. ECV with ERAF 3/16/21  f. Echo, 3/16/21, EF 42%, The following left ventricular wall segments are hypokinetic: basal inferolateral, mid inferolateral, apical inferior, mid inferior, apical septal and basal inferior.  g. Pulmonary vein ablation with cath ablation of right atrial isthmus dependent flutter 4/16/2021  h. Echocardiogram 10/15/2021: EF 50%, mild AS/AR, mild LAE  2. Sick Sinus Syndrome  a. Dual Chamber [employer]  PPM 7/2013  b. Echo, 3/16/21, EF 42%, The following left ventricular wall segments are hypokinetic: basal inferolateral, mid inferolateral, apical inferior, mid inferior, apical septal and basal inferior.     3. SVT  a. S/p RFA AVnRT in [place] in 2011  4. Tobacco use - resolved  5. DJD  6. GERD  7. Hiatal Hernia  8. TIANA - on CPAP  9. Asthma/COPD  10. Anxiety/Depression - with previous suicide attempt  11. BPH  12. DJD  13. Diverticulosis  14. Surgical History:  a. Tonsillectomy  b. Right total hip replacement      History of Present Illness:     Mr. Pham is a pleasant 76 year old WM with above noted PMH. He presents today with plans to undergo generator change for his BSC ppm that was found to have reached DUNCAN 1/29/22. He denies Fatigue, SOB, CP, LH, and dizziness, syncope. Denies any hospitalizations, ER visits, bleeding issues, or TIA/CVA symptoms.  Overall feels well. BP well controlled. NPO since  MN. No recent illnesses or infections. Xarelto last dose, 5/6/22.        Allergies   Allergen Reactions   • Penicillins Hives   • Nuts Mental Status Change     WALNUTS - BECOMES VERY SLEEPY       Prior to Admission Medications     Prescriptions Last Dose Informant Patient Reported? Taking?    acetaminophen (TYLENOL) 500 MG tablet 5/8/2022 Medication Bottle Yes Yes    Take 500 mg by mouth Every 6 (Six) Hours As Needed for Mild Pain .    cholecalciferol (VITAMIN D3) 25 MCG (1000 UT) tablet 5/8/2022 Medication Bottle Yes Yes    Take 1,000 Units by mouth Daily.    cycloSPORINE (RESTASIS) 0.05 % ophthalmic emulsion 5/9/2022 Medication Bottle Yes Yes    Administer 1 drop to both eyes 2 (Two) Times a Day.    glucosamine-chondroitin 500-400 MG capsule capsule 5/8/2022 Self Yes Yes    Take 1 capsule by mouth 2 (Two) Times a Day With Meals.    hydrOXYzine (ATARAX) 25 MG tablet 5/8/2022  Yes Yes    Take 25 mg by mouth Every 8 (Eight) Hours As Needed.    Magnesium Cl-Calcium Carbonate (SLOW-MAG PO) 5/8/2022 Medication Bottle Yes Yes    Take 1 tablet by mouth 2 (two) times a day.    multivitamin with minerals tablet tablet 5/8/2022 Medication Bottle Yes Yes    Take 1 tablet by mouth Daily.    rosuvastatin (CRESTOR) 10 MG tablet 5/8/2022  Yes Yes    Take 10 mg by mouth Daily.    TESTOSTERONE AQUEOUS IM Past Week  Yes Yes    Inject 10 mL into the appropriate muscle as directed by prescriber Every 14 (Fourteen) Days.    vitamin B-12 (CYANOCOBALAMIN) 1000 MCG tablet 5/8/2022 Medication Bottle Yes Yes    Take 1,000 mcg by mouth Daily.    nitroglycerin (NITROSTAT) 0.4 MG SL tablet Unknown Medication Bottle Yes No    Place 0.4 mg under the tongue Every 5 (Five) Minutes As Needed for Chest Pain. Take no more than 3 doses in 15 minutes.    rivaroxaban (XARELTO) 20 MG tablet 5/7/2022  No No    Take 1 tablet by mouth Daily With Dinner.            Current Facility-Administered Medications:   •  acetaminophen (TYLENOL) tablet 650 mg, 650 mg,  Oral, Q4H PRN, Lizabeth Carlson APRN  •  nitroglycerin (NITROSTAT) SL tablet 0.4 mg, 0.4 mg, Sublingual, Q5 Min PRN, Lizabeth Carlson APRN  •  sodium chloride 0.9 % flush 10 mL, 10 mL, Intravenous, PRN, Lizabeth Carlson APRN  •  sodium chloride 0.9 % flush 3 mL, 3 mL, Intravenous, Q12H, Lizabeth Carlson APRN  •  sodium chloride 0.9 % infusion, 1 mL/kg/hr (Order-Specific), Intravenous, Continuous, Lizabeth Carlson APRN, Last Rate: 98.4 mL/hr at 22 0743, 1 mL/kg/hr at 22 0743  •  vancomycin 1500 mg/500 mL 0.9% NS IVPB ([facility]), 1,500 mg, Intravenous, Once, Lizabeth Carlson APRN    Social History     Socioeconomic History   • Marital status: Single   Tobacco Use   • Smoking status: Former Smoker     Packs/day: 1.00     Years: 10.00     Pack years: 10.00     Types: Cigarettes, Cigars     Start date: 1955     Quit date: 1974     Years since quittin.3   • Smokeless tobacco: Never Used   Vaping Use   • Vaping Use: Never used   Substance and Sexual Activity   • Alcohol use: Yes     Alcohol/week: 5.0 standard drinks     Types: 5 Glasses of wine per week     Comment: By   • Drug use: Never   • Sexual activity: Yes     Partners: Female     Birth control/protection: None       Family History   Problem Relation Age of Onset   • Asthma Mother                REVIEW OF SYSTEMS:   CONSTITUTIONAL:         No weight loss, fever, chills, weakness or fatigue.   HEENT:                            No visual loss, blurred vision, double vision, yellow sclerae.                                             No hearing loss, congestion, sore throat.   SKIN:                                No rashes, urticaria, ulcers, sores.     RESPIRATORY:               No shortness of breath, hemoptysis, cough, sputum.   GI:                                     No anorexia, nausea, vomiting, diarrhea. No abdominal pain, melena.   :                                   No burning on urination,  "hematuria or increased frequency.  ENDOCRINE:                   No diaphoresis, cold or heat intolerance. No polyuria or polydipsia.   NEURO:                            No headache, dizziness, syncope, paralysis, ataxia, or parasthesias.                                            No change in bowel or bladder control. No history of CVA/TIA  MUSCULOSKELETAL:    No muscle, back pain, joint pain or stiffness.   HEMATOLOGY:               No anemia, bleeding, bruising. No history of DVT/PE.  PSYCH:                            No history of depression, anxiety    Vitals:    05/09/22 0705 05/09/22 0707   BP: 128/78 135/84   BP Location: Right arm Left arm   Patient Position: Lying Lying   Pulse: 56    Resp: 18    Temp: 97.5 °F (36.4 °C)    TempSrc: Temporal    SpO2: 96% 96%   Weight: 98.5 kg (217 lb 3.2 oz)    Height: 165.1 cm (65\")          Vital Sign Min/Max for last 24 hours  Temp  Min: 97.5 °F (36.4 °C)  Max: 97.5 °F (36.4 °C)   BP  Min: 128/78  Max: 135/84   Pulse  Min: 56  Max: 56   Resp  Min: 18  Max: 18   SpO2  Min: 96 %  Max: 96 %   No data recorded    No intake or output data in the 24 hours ending 05/09/22 0817          Physical Exam:  GEN: Well nourished, Well- developed  No acute distress  HEENT: Normocephalic, Atraumatic, PERRLA, moist mucous membranes  NECK: supple, NO JVD, no thyromegaly, no lymphadenopathy  CARDIAC: S1S2  RRR no murmur, gallop, rub  LUNGS: Clear to ausculation, normal respiratory effort  ABDOMEN: Soft, nontender, normal bowel sounds  EXTREMITIES:No gross deformities,  No clubbing, cyanosis, or edema  SKIN: Warm, dry  NEURO: No focal deficits  PSYCHIATRIC: Normal affect and mood      I personally viewed and interpreted the patient's EKG/Telemetry/lab data    Data:   Results from last 7 days   Lab Units 05/09/22  0713   WBC 10*3/mm3 8.03   HEMOGLOBIN g/dL 13.8   HEMATOCRIT % 38.0   PLATELETS 10*3/mm3 237     Results from last 7 days   Lab Units 05/09/22  0713   SODIUM mmol/L 138   POTASSIUM " mmol/L 4.0   CHLORIDE mmol/L 100   CO2 mmol/L 28.0   BUN mg/dL 18   CREATININE mg/dL 0.73*   GLUCOSE mg/dL 101*                                  No intake or output data in the 24 hours ending 05/09/22 0817    Chest X-Ray:  Imaging Results (Last 24 Hours)     ** No results found for the last 24 hours. **          Telemetry: NSR 60 bpm   EKG: None for review today    Assessment and Plan:     1) Tachycardia-bradycardia syndrome s/p [employer] dual-chamber pacemaker; reached DUNCAN 1/29/2022. Patient will undergo ppm generator change out with Dr. Reyna today. The risks, benefits, and alternatives of the procedure have been reviewed and the patient wishes to proceed. NPO since MN. Xarelto held x 2 days.     2) Symptomatic persistent atrial fibrillation status post pulmonary vein ablation with right atrial isthmus dependent flutter on 4/16/2021. Last interrogation, 4/6/22, NSR/AP with 49% AF.  - was on Sotalol but has self discontinued 2/2022.       3) DCM   -Echo, 10/15/2021: EF 50%, mild AS/AR, mild LAE     4) Anticoagulation: [id number] score of 2-on Xarelto since 4/6/22 (held x 2 days, last dose 5/6/22)       Electronically signed by ALBINO Peñaloza, 05/09/22, 8:00 AM EDT.

## 2022-05-17 ENCOUNTER — OFFICE VISIT (OUTPATIENT)
Dept: CARDIOLOGY | Facility: CLINIC | Age: 77
End: 2022-05-17

## 2022-05-17 DIAGNOSIS — I48.19 ATRIAL FIBRILLATION, PERSISTENT: Primary | ICD-10-CM

## 2022-05-17 DIAGNOSIS — I47.1 SVT (SUPRAVENTRICULAR TACHYCARDIA): ICD-10-CM

## 2022-05-17 DIAGNOSIS — Z45.018 ELECTIVE REPLACEMENT INDICATED FOR PACEMAKER: ICD-10-CM

## 2022-05-17 PROCEDURE — 99024 POSTOP FOLLOW-UP VISIT: CPT | Performed by: INTERNAL MEDICINE

## 2022-05-18 NOTE — PROGRESS NOTES
2022    Benoit Pham, : 1945    WOUND CHECK    Patient here for wound check    Patient has fever: [] Temperature if indicated    Wound Location: Left intra clavicular  Dressing Removed [x]        Old Dressing Appearance:  Clean, dry [x]                 Old, bloody drainage [x]                            Moist, serous drainage []                Moist, thick yellow/green drainage []       Wound Appearance: Redness []                  Drainage []                  Culture obtained []        Color:      Consistency:      Amount:         Gloves used, wound cleansed with sterile 4x4 and peroxide [x]       MD notified [] MD orders    Antibiotic started []  If checked, type   Other:     Appointment for follow-up scheduled for 3 months post procedure [x]    Future Appointments   Date Time Provider Department Center   2022 10:15 AM Varun Reyna MD [facility]   2022  3:00 PM Varun Reyna MD [facility]           Jessica Stevenson MA, 22      MD Signature:______________________________ Completed By/Date:

## 2022-08-17 ENCOUNTER — OFFICE VISIT (OUTPATIENT)
Dept: CARDIOLOGY | Facility: CLINIC | Age: 77
End: 2022-08-17

## 2022-08-17 VITALS
HEART RATE: 74 BPM | WEIGHT: 205 LBS | SYSTOLIC BLOOD PRESSURE: 126 MMHG | BODY MASS INDEX: 34.16 KG/M2 | DIASTOLIC BLOOD PRESSURE: 68 MMHG | HEIGHT: 65 IN | OXYGEN SATURATION: 97 %

## 2022-08-17 DIAGNOSIS — I48.4 ATYPICAL ATRIAL FLUTTER: Primary | ICD-10-CM

## 2022-08-17 DIAGNOSIS — I42.0 DCM (DILATED CARDIOMYOPATHY): ICD-10-CM

## 2022-08-17 DIAGNOSIS — I48.3 TYPICAL ATRIAL FLUTTER: ICD-10-CM

## 2022-08-17 DIAGNOSIS — I48.19 ATRIAL FIBRILLATION, PERSISTENT: ICD-10-CM

## 2022-08-17 DIAGNOSIS — I49.5 SSS (SICK SINUS SYNDROME): ICD-10-CM

## 2022-08-17 PROCEDURE — 93280 PM DEVICE PROGR EVAL DUAL: CPT | Performed by: INTERNAL MEDICINE

## 2022-08-17 PROCEDURE — 93000 ELECTROCARDIOGRAM COMPLETE: CPT | Performed by: INTERNAL MEDICINE

## 2022-08-17 PROCEDURE — 99214 OFFICE O/P EST MOD 30 MIN: CPT | Performed by: INTERNAL MEDICINE

## 2022-08-17 RX ORDER — IBUPROFEN 600 MG/1
TABLET ORAL AS NEEDED
COMMUNITY
Start: 2022-06-07

## 2022-08-17 RX ORDER — CITALOPRAM 20 MG/1
TABLET ORAL AS NEEDED
COMMUNITY
Start: 2022-08-09 | End: 2023-02-16

## 2022-08-17 NOTE — PROGRESS NOTES
Benoit Pham  1945  [phone]      08/17/2022      [facility] CARDIOLOGY     Lopez, Mitul Muniz MD  [address] [place] [address] [place] [address]    Chief Complaint   Patient presents with   • Atrial Fibrillation       Problem List:    1. Persistent Atrial Fibrillation   a. CHADSvasc = 2  b. Echocardiogram 7/19/2013: EF 60-65%, no significant valve abnormalities   c. LHC 11/11/2013: minimal luminal irregularities involving RCA and LAD, normal EF   d. Treated with beta blocker   e. ECV with ERAF 3/16/21  f. Echo, 3/16/21, EF 42%, The following left ventricular wall segments are hypokinetic: basal inferolateral, mid inferolateral, apical inferior, mid inferior, apical septal and basal inferior.  g. Pulmonary vein ablation with cath ablation of right atrial isthmus dependent flutter 4/16/2021  h. Echocardiogram 10/15/2021: EF 50%, mild AS/AR, mild LAE  2. Sick Sinus Syndrome  a. Dual Chamber [employer]  PPM 7/2013  b. Echo, 3/16/21, EF 42%, The following left ventricular wall segments are hypokinetic: basal inferolateral, mid inferolateral, apical inferior, mid inferior, apical septal and basal inferior.     3. SVT  a. S/p RFA AVnRT in [place] in 2011  4. Tobacco use - resolved  5. DJD  6. GERD  7. Hiatal Hernia  8. TIANA - on CPAP  9. Asthma/COPD  10. Anxiety/Depression - with previous suicide attempt  11. BPH  12. DJD  13. Diverticulosis  14. Surgical History:  a. Tonsillectomy  b. Right total hip replacement      Allergies  Allergies   Allergen Reactions   • Penicillins Hives   • Nuts Mental Status Change     WALNUTS - BECOMES VERY SLEEPY       Current Medications    Current Outpatient Medications:   •  acetaminophen (TYLENOL) 500 MG tablet, Take 500 mg by mouth Every 6 (Six) Hours As Needed for Mild Pain ., Disp: , Rfl:   •  cholecalciferol (VITAMIN D3) 25 MCG (1000 UT) tablet, Take 1,000 Units by mouth Daily., Disp: , Rfl:   •  citalopram (CeleXA) 20 MG tablet, Daily., Disp: , Rfl:    •  cycloSPORINE (RESTASIS) 0.05 % ophthalmic emulsion, Administer 1 drop to both eyes 2 (Two) Times a Day., Disp: , Rfl:   •  glucosamine-chondroitin 500-400 MG capsule capsule, Take 1 capsule by mouth 2 (Two) Times a Day With Meals., Disp: , Rfl:   •  hydrOXYzine (ATARAX) 25 MG tablet, Take 25 mg by mouth Every 8 (Eight) Hours As Needed., Disp: , Rfl:   •  ibuprofen (ADVIL,MOTRIN) 600 MG tablet, As Needed., Disp: , Rfl:   •  Magnesium Cl-Calcium Carbonate (SLOW-MAG PO), Take 1 tablet by mouth 2 (two) times a day., Disp: , Rfl:   •  multivitamin with minerals tablet tablet, Take 1 tablet by mouth Daily., Disp: , Rfl:   •  nitroglycerin (NITROSTAT) 0.4 MG SL tablet, Place 0.4 mg under the tongue Every 5 (Five) Minutes As Needed for Chest Pain. Take no more than 3 doses in 15 minutes., Disp: , Rfl:   •  polyethyl glycol-propyl glycol (SYSTANE) 0.4-0.3 % solution ophthalmic solution (artificial tears), Every 1 (One) Hour As Needed., Disp: , Rfl:   •  rivaroxaban (XARELTO) 20 MG tablet, Take 1 tablet by mouth Daily With Dinner. First dose on Thursday 5/12/2022, Disp: 30 tablet, Rfl: 11  •  rosuvastatin (CRESTOR) 10 MG tablet, Take 10 mg by mouth Daily., Disp: , Rfl:   •  TESTOSTERONE AQUEOUS IM, Inject 10 mL into the appropriate muscle as directed by prescriber Every 14 (Fourteen) Days., Disp: , Rfl:   •  vitamin B-12 (CYANOCOBALAMIN) 1000 MCG tablet, Take 1,000 mcg by mouth Daily., Disp: , Rfl:     History of Present Illness     Pt presents for follow up of AF/SSS/SVT .Since the pt has seen us in clinic last, pt denies any syncope, SOB, CP, LH, and dizziness. Denies any hospitalizations, ER visits, bleeding, or TIA/CVA symptoms. Overall feels well.  Unaware of any atrial arrhythmias.  Does at times get anxious which is usually rare and short-lived.  Does not check blood pressure at home.  Heart rates however have been stable.  Able to do exercising without difficulty at home.    ROS:  General:  Denies fatigue, weight  "gain or loss  Cardiovascular:  Denies CP, PND, syncope, near syncope, edema or palpitations.  Pulmonary:  Denies TRAN, cough, or wheezing    Vitals:    08/17/22 0953   BP: 126/68   BP Location: Left arm   Patient Position: Sitting   Pulse: 74   SpO2: 97%   Weight: 93 kg (205 lb)   Height: 165.1 cm (65\")       PE:  General: NAD  Neck: no JVD, no carotid bruits, no TM  Heart: Irregular irregular rate and rhythm, NL S1, S2, no rubs, murmurs  Lungs: CTA, no wheezes, rhonchi, or rales  Abd: soft, non-tender, NL BS  Ext: No musculoskeletal deformities, no edema, cyanosis, or clubbing  Psych: normal mood and affect    Diagnostic Data:    ECG 12 Lead    Date/Time: 8/17/2022 10:18 AM  Performed by: Varun Reyna MD  Authorized by: Varun Reyna MD   Comparison: compared with previous ECG from 4/6/2022  Comparison to previous ECG: Now in AFL  Rhythm: atrial flutter and paced  BPM: 70              PM Manual Interrogation: 53% AFL, 12% RA paced, 19% RV paced    1. Atypical atrial flutter (HCC)    2. Atrial fibrillation, persistent (HCC)    3. Typical atrial flutter (HCC)    4. SSS (sick sinus syndrome) ([facility])    5. DCM (dilated cardiomyopathy) ([facility])           Plan:    1)  Symptomatic persistent atrial fibrillation status post pulmonary vein ablation with right atrial isthmus dependent flutter on 4/16/2021.  Now in atypical left atrial flutter approximately 53% by interrogation of pacemaker.  Off antiarrhythmic medications.  Patient asymptomatic at this time.  Options include monitoring for now, antiarrhythmic medication, or pursuing left atrial flutter ablation.  Patient since he is asymptomatic and doing well would like to monitor    2) Tachycardia-bradycardia syndrome s/p [employer] dual-chamber pacemaker. NL PM fxn     3) DCM   -Echo, 10/15/2021: EF 50%, mild AS/AR, mild LAE     4) Anticoagulation: [id number] score of 2-on Xarelto     F/up in 6 months      "

## 2022-12-14 ENCOUNTER — TELEPHONE (OUTPATIENT)
Dept: CARDIOLOGY | Facility: CLINIC | Age: 77
End: 2022-12-14

## 2022-12-14 NOTE — TELEPHONE ENCOUNTER
Called Mr Vero due to [employer] home monitor isn't reading.  He isn't home but will check all connections tonight and we will see if scheduled reading comes through tonight.

## 2023-02-14 ENCOUNTER — TELEPHONE (OUTPATIENT)
Dept: CARDIOLOGY | Facility: CLINIC | Age: 78
End: 2023-02-14
Payer: MEDICARE

## 2023-02-14 NOTE — TELEPHONE ENCOUNTER
Pt called to report he's received a new [employer] Lat[employer] remote home monitor today. Pt wants to ensure the monitor is assigned to this device clinic as the shipping invoice states [facility].    I spoke with a [facility]  who states the pt's home monitor is assigned to this device clinic.  advised to have pt call [employer] to verify the serial number on the new home monitor.     I spoke w/pt and instructed him to call [employer] and provided customer service phone number. Pt verbalized understanding & compliance.

## 2023-02-16 ENCOUNTER — OFFICE VISIT (OUTPATIENT)
Dept: CARDIOLOGY | Facility: CLINIC | Age: 78
End: 2023-02-16
Payer: MEDICARE

## 2023-02-16 VITALS
HEIGHT: 65 IN | DIASTOLIC BLOOD PRESSURE: 80 MMHG | SYSTOLIC BLOOD PRESSURE: 126 MMHG | BODY MASS INDEX: 34.99 KG/M2 | WEIGHT: 210 LBS | OXYGEN SATURATION: 97 % | HEART RATE: 88 BPM

## 2023-02-16 DIAGNOSIS — Z45.018 ELECTIVE REPLACEMENT INDICATED FOR PACEMAKER: ICD-10-CM

## 2023-02-16 DIAGNOSIS — I47.1 SVT (SUPRAVENTRICULAR TACHYCARDIA): ICD-10-CM

## 2023-02-16 DIAGNOSIS — I49.5 SSS (SICK SINUS SYNDROME): Primary | ICD-10-CM

## 2023-02-16 DIAGNOSIS — I48.19 ATRIAL FIBRILLATION, PERSISTENT: ICD-10-CM

## 2023-02-16 DIAGNOSIS — Z95.0 PRESENCE OF CARDIAC PACEMAKER: ICD-10-CM

## 2023-02-16 DIAGNOSIS — I10 ESSENTIAL HYPERTENSION: ICD-10-CM

## 2023-02-16 PROCEDURE — 93280 PM DEVICE PROGR EVAL DUAL: CPT | Performed by: PHYSICIAN ASSISTANT

## 2023-02-16 PROCEDURE — 99214 OFFICE O/P EST MOD 30 MIN: CPT | Performed by: PHYSICIAN ASSISTANT

## 2023-02-16 RX ORDER — TADALAFIL 10 MG/1
TABLET ORAL AS NEEDED
COMMUNITY
Start: 2023-02-10

## 2023-02-16 RX ORDER — CIPROFLOXACIN 500 MG/1
TABLET, FILM COATED ORAL 2 TIMES DAILY
COMMUNITY
Start: 2023-02-09

## 2023-02-16 NOTE — PROGRESS NOTES
Benoit Pham  1945  [phone]            [facility] CARDIOLOGY MAIN CAMPUS     Lopez, Mitul Muniz MD  [address] [facility] [place] [address]    Chief Complaint   Patient presents with   • Atrial Fibrillation       Problem List:    1. Persistent Atrial Fibrillation   a. CHADSvasc = 2  b. Echocardiogram 7/19/2013: EF 60-65%, no significant valve abnormalities   c. LHC 11/11/2013: minimal luminal irregularities involving RCA and LAD, normal EF   d. Treated with beta blocker   e. ECV with ERAF 3/16/21  f. Echo, 3/16/21, EF 42%, The following left ventricular wall segments are hypokinetic: basal inferolateral, mid inferolateral, apical inferior, mid inferior, apical septal and basal inferior.  g. Pulmonary vein ablation with cath ablation of right atrial isthmus dependent flutter 4/16/2021  h. Echocardiogram 10/15/2021: EF 50%, mild AS/AR, mild LAE  i. Recurrent Aflutter, 50% burden by pacemaker interrogation 8/2022-asymptomatic   2. Sick Sinus Syndrome  a. Dual Chamber [employer]  PPM 7/2013  b. Echo, 3/16/21, EF 42%, The following left ventricular wall segments are hypokinetic: basal inferolateral, mid inferolateral, apical inferior, mid inferior, apical septal and basal inferior.     3. SVT  a. S/p RFA AVnRT in [place] in 2011  4. Tobacco use - resolved  5. DJD  6. GERD  7. Hiatal Hernia  8. TIANA - on CPAP  9. Asthma/COPD  10. Anxiety/Depression - with previous suicide attempt  11. BPH  12. DJD  13. Diverticulosis  14. Surgical History:  a. Tonsillectomy  b. Right total hip replacement      Allergies  Allergies   Allergen Reactions   • Penicillins Hives   • Nuts Mental Status Change     WALNUTS - BECOMES VERY SLEEPY       Current Medications    Current Outpatient Medications:   •  acetaminophen (TYLENOL) 500 MG tablet, Take 500 mg by mouth Every 6 (Six) Hours As Needed for Mild Pain ., Disp: , Rfl:   •  cholecalciferol (VITAMIN D3) 25 MCG (1000 UT) tablet, Take 1,000 Units by  mouth Daily., Disp: , Rfl:   •  ciprofloxacin (CIPRO) 500 MG tablet, 2 (Two) Times a Day., Disp: , Rfl:   •  cycloSPORINE (RESTASIS) 0.05 % ophthalmic emulsion, Administer 1 drop to both eyes 2 (Two) Times a Day., Disp: , Rfl:   •  glucosamine-chondroitin 500-400 MG capsule capsule, Take 1 capsule by mouth 2 (Two) Times a Day With Meals., Disp: , Rfl:   •  hydrOXYzine (ATARAX) 25 MG tablet, Take 25 mg by mouth Every 8 (Eight) Hours As Needed., Disp: , Rfl:   •  ibuprofen (ADVIL,MOTRIN) 600 MG tablet, As Needed., Disp: , Rfl:   •  Magnesium Cl-Calcium Carbonate (SLOW-MAG PO), Take 1 tablet by mouth 2 (two) times a day., Disp: , Rfl:   •  multivitamin with minerals tablet tablet, Take 1 tablet by mouth Daily., Disp: , Rfl:   •  nitroglycerin (NITROSTAT) 0.4 MG SL tablet, Place 0.4 mg under the tongue Every 5 (Five) Minutes As Needed for Chest Pain. Take no more than 3 doses in 15 minutes., Disp: , Rfl:   •  polyethyl glycol-propyl glycol (SYSTANE) 0.4-0.3 % solution ophthalmic solution (artificial tears), Every 1 (One) Hour As Needed., Disp: , Rfl:   •  rivaroxaban (XARELTO) 20 MG tablet, Take 1 tablet by mouth Daily With Dinner. First dose on Thursday 5/12/2022, Disp: 30 tablet, Rfl: 11  •  rosuvastatin (CRESTOR) 10 MG tablet, Take 10 mg by mouth Daily., Disp: , Rfl:   •  tadalafil (CIALIS) 10 MG tablet, As Needed., Disp: , Rfl:   •  TESTOSTERONE AQUEOUS IM, Inject 10 mL into the appropriate muscle as directed by prescriber Every 14 (Fourteen) Days., Disp: , Rfl:   •  vitamin B-12 (CYANOCOBALAMIN) 1000 MCG tablet, Take 1,000 mcg by mouth Daily., Disp: , Rfl:     History of Present Illness     Pt presents for follow up of AF/SSS/SVT .  Since last office visit the patient states she has had some shortness of breath at times but does continue to remain active.  He has noticed occasional palpitations.  He denies orthopnea PND worsening peripheral edema.  He denies chest pain exertion/angina pectoris.  Symptoms at  "nighttime he has discomfort like he cannot catch his breath with lying flat at night he thinks may be this anxiety.  Otherwise he is doing well and his Xarelto therapy no bleeding issues.  He does think he is little more short of breath than usual he would like to pursue a follow-up echocardiogram regarding his aortic valve disease and mild cardiomyopathy.    Vitals:    02/16/23 1023   BP: 126/80   BP Location: Right arm   Patient Position: Sitting   Pulse: 88   SpO2: 97%   Weight: 95.3 kg (210 lb)   Height: 165.1 cm (65\")       PE:  General: NAD  Neck: no JVD, no carotid bruits, no TM  Heart: Irregular irregular rate and rhythm, NL S1, S2, no rubs, murmurs  Lungs: CTA, no wheezes, rhonchi, or rales  Abd: soft, non-tender, NL BS  Ext: No musculoskeletal deformities, no edema, cyanosis, or clubbing  Psych: normal mood and affect      Procedures    PM Manual Interrogation:  Manual interrogation of device.  [employer] pacemaker.  Dual-chamber.  A paced 9% V paced 2%.  P wave 2.5 mV.  R wave 9.4 mV.  Currently in A-fib.  RV threshold 0.4 V at 0.5 ms.  Atrial lead impedance 551 ohms.  RV impedance 605 ohms.  7 years on the battery.  62% mode switch max episodes are 48 hours, 34 episodes of high ventricular heart rate.    1. SSS (sick sinus syndrome) (HCC)    2. Atrial fibrillation, persistent (HCC)    3. SVT (supraventricular tachycardia) ([facility])    4. Presence of cardiac pacemaker    5. Essential hypertension    6. Elective replacement indicated for pacemaker           Plan:    1)  Symptomatic persistent atrial fibrillation status post pulmonary vein ablation with right atrial isthmus dependent flutter on 4/16/2021.  Now in atypical left atrial flutter approximately 62% by interrogation of pacemaker.  Off antiarrhythmic medications.  On pacemaker interrogation today does does reveal some elevated heart rates.  Would like to restart beta-blocker therapy if he can tolerate this to try to improve some of his " symptoms.    2) Tachycardia-bradycardia syndrome s/p [employer] dual-chamber pacemaker. NL PM fxn     3) DCM   -Echo, 10/15/2021: EF 42%, mild AS/AR, mild LAE, would like to repeat echocardiogram regarding mild aortic stenosis, cardiomyopathy,, shortness of breath.     4) Anticoagulation: [id number] score of 2-on Xarelto     5) sleep apnea: Patient reports chronic fatigue.  Would like him to reestablish use of CPAP this may be a source of a lot of his symptoms and his progressive worsening A-fib.    Sleep study, recheck echocardiogram regarding cardiomyopathy and aortic valve disease  Add Lopressor 25 mg twice daily for further rate control of A-fib/flutter with RVR  Return follow-up her office 6 months or sooner as needed      Electronically signed by TOMER Miner, 02/16/23, 12:46 PM EST.

## 2023-03-15 ENCOUNTER — HOSPITAL ENCOUNTER (OUTPATIENT)
Dept: CARDIOLOGY | Facility: HOSPITAL | Age: 78
Discharge: HOME | End: 2023-03-15
Admitting: PHYSICIAN ASSISTANT
Payer: MEDICARE

## 2023-03-15 DIAGNOSIS — Z45.018 ELECTIVE REPLACEMENT INDICATED FOR PACEMAKER: ICD-10-CM

## 2023-03-15 DIAGNOSIS — I49.5 SSS (SICK SINUS SYNDROME): ICD-10-CM

## 2023-03-15 DIAGNOSIS — I47.1 SVT (SUPRAVENTRICULAR TACHYCARDIA): ICD-10-CM

## 2023-03-15 DIAGNOSIS — I48.19 ATRIAL FIBRILLATION, PERSISTENT: ICD-10-CM

## 2023-03-15 DIAGNOSIS — I10 ESSENTIAL HYPERTENSION: ICD-10-CM

## 2023-03-15 DIAGNOSIS — Z95.0 PRESENCE OF CARDIAC PACEMAKER: ICD-10-CM

## 2023-03-15 PROCEDURE — 93306 TTE W/DOPPLER COMPLETE: CPT | Performed by: INTERNAL MEDICINE

## 2023-03-15 PROCEDURE — 93306 TTE W/DOPPLER COMPLETE: CPT

## 2023-03-20 LAB
ASCENDING AORTA: 3.7 CM
BH CV ECHO MEAS - AO MAX PG: 10.3 MMHG
BH CV ECHO MEAS - AO MEAN PG: 6 MMHG
BH CV ECHO MEAS - AO ROOT DIAM: 4 CM
BH CV ECHO MEAS - AO V2 MAX: 160 CM/SEC
BH CV ECHO MEAS - AO V2 VTI: 34.6 CM
BH CV ECHO MEAS - AVA(I,D): 1.49 CM2
BH CV ECHO MEAS - EDV(CUBED): 132.5 ML
BH CV ECHO MEAS - EDV(MOD-SP2): 79.7 ML
BH CV ECHO MEAS - EDV(MOD-SP4): 78.3 ML
BH CV ECHO MEAS - EF(MOD-SP2): 45 %
BH CV ECHO MEAS - EF(MOD-SP4): 48.9 %
BH CV ECHO MEAS - ESV(CUBED): 56.1 ML
BH CV ECHO MEAS - ESV(MOD-SP2): 43.8 ML
BH CV ECHO MEAS - ESV(MOD-SP4): 40 ML
BH CV ECHO MEAS - FS: 24.9 %
BH CV ECHO MEAS - IVS/LVPW: 1.26 CM
BH CV ECHO MEAS - IVSD: 1.07 CM
BH CV ECHO MEAS - LA DIMENSION: 3.8 CM
BH CV ECHO MEAS - LAT PEAK E' VEL: 10.2 CM/SEC
BH CV ECHO MEAS - LV DIASTOLIC VOL/BSA (35-75): 38.7 CM2
BH CV ECHO MEAS - LV MASS(C)D: 176.6 GRAMS
BH CV ECHO MEAS - LV MAX PG: 2.42 MMHG
BH CV ECHO MEAS - LV MEAN PG: 1 MMHG
BH CV ECHO MEAS - LV SYSTOLIC VOL/BSA (12-30): 19.8 CM2
BH CV ECHO MEAS - LV V1 MAX: 77.6 CM/SEC
BH CV ECHO MEAS - LV V1 VTI: 15.9 CM
BH CV ECHO MEAS - LVIDD: 5.1 CM
BH CV ECHO MEAS - LVIDS: 3.8 CM
BH CV ECHO MEAS - LVOT AREA: 3.2 CM2
BH CV ECHO MEAS - LVOT DIAM: 2.03 CM
BH CV ECHO MEAS - LVPWD: 0.84 CM
BH CV ECHO MEAS - MED PEAK E' VEL: 6.5 CM/SEC
BH CV ECHO MEAS - MV A MAX VEL: 50.4 CM/SEC
BH CV ECHO MEAS - MV DEC SLOPE: 302.9 CM/SEC2
BH CV ECHO MEAS - MV DEC TIME: 0.19 MSEC
BH CV ECHO MEAS - MV E MAX VEL: 83.4 CM/SEC
BH CV ECHO MEAS - MV E/A: 1.65
BH CV ECHO MEAS - MV P1/2T: 82.5 MSEC
BH CV ECHO MEAS - MVA(P1/2T): 2.7 CM2
BH CV ECHO MEAS - PA ACC TIME: 0.07 SEC
BH CV ECHO MEAS - PA PR(ACCEL): 47.3 MMHG
BH CV ECHO MEAS - RAP SYSTOLE: 3 MMHG
BH CV ECHO MEAS - RVSP: 20 MMHG
BH CV ECHO MEAS - SI(MOD-BP): 18.6 ML/M2
BH CV ECHO MEAS - SI(MOD-SP2): 17.8 ML/M2
BH CV ECHO MEAS - SI(MOD-SP4): 18.9 ML/M2
BH CV ECHO MEAS - SV(LVOT): 51.4 ML
BH CV ECHO MEAS - SV(MOD-SP2): 35.9 ML
BH CV ECHO MEAS - SV(MOD-SP4): 38.2 ML
BH CV ECHO MEAS - TAPSE (>1.6): 2.7 CM
BH CV ECHO MEAS - TR MAX PG: 17.1 MMHG
BH CV ECHO MEAS - TR MAX VEL: 206.6 CM/SEC
BH CV ECHO MEASUREMENTS AVERAGE E/E' RATIO: 9.99
BH CV XLRA - RV BASE: 5.5 CM
BH CV XLRA - RV LENGTH: 7 CM
BH CV XLRA - RV MID: 3.8 CM
BH CV XLRA - TDI S': 12.4 CM/SEC
LEFT ATRIUM VOLUME INDEX: 23.8 ML/M2
MAXIMAL PREDICTED HEART RATE: 142 BPM
STRESS TARGET HR: 121 BPM

## 2024-01-23 ENCOUNTER — TELEPHONE (OUTPATIENT)
Dept: CARDIOLOGY | Facility: CLINIC | Age: 79
End: 2024-01-23

## 2024-01-23 NOTE — TELEPHONE ENCOUNTER
Caller: Benoit Pham    Relationship: Self    Best call back number: [phone]    What is the best time to reach you: ANY    Who are you requesting to speak with (clinical staff, provider,  specific staff member): ANY      What was the call regarding: PT WOULD LIKE A REFERRAL TO DR. LOBITO WALLS IN [place] FOR HEART FAILURE. WOULD LIKE SOMEONE TO CALL HIM TO DISCUSS    Is it okay if the provider responds through MyChart: NO

## 2024-01-26 NOTE — TELEPHONE ENCOUNTER
"Called patient and patient states he was told at his last visit that he had heart failure and that he \"is beating the odds\" with his heart. Heart failure diagnosis is not present in his chart. He states he must have heart failure as 1-2 times a month he is having episodes of dizziness/weakness where he falls. States these were the same episodes he had at his last appointment here and we are aware. No mention of these episodes in his last office note. Patient does not have remote monitoring set up at this time and unable to send through remote transmissions. Per last office visit in 2/2023, patient to f/u in 6 months--Message sent to scheduling to have patient see Will Jose Luis MIXON within the next week to discuss his concerns more in depth. Patient advised to go to ER if symptoms worsen or he develops chest pain, shortness of breath, syncope.   "

## 2025-04-29 ENCOUNTER — OFFICE VISIT (OUTPATIENT)
Dept: SLEEP MEDICINE | Facility: CLINIC | Age: 80
End: 2025-04-29
Payer: MEDICARE

## 2025-04-29 VITALS
SYSTOLIC BLOOD PRESSURE: 128 MMHG | DIASTOLIC BLOOD PRESSURE: 80 MMHG | HEIGHT: 65 IN | OXYGEN SATURATION: 96 % | BODY MASS INDEX: 33.15 KG/M2 | WEIGHT: 199 LBS | HEART RATE: 61 BPM | TEMPERATURE: 97.9 F

## 2025-04-29 DIAGNOSIS — G47.33 OSA (OBSTRUCTIVE SLEEP APNEA): Primary | ICD-10-CM

## 2025-04-29 DIAGNOSIS — G47.10 HYPERSOMNOLENCE: ICD-10-CM

## 2025-04-29 DIAGNOSIS — E66.9 OBESITY (BMI 30-39.9): ICD-10-CM

## 2025-04-29 PROCEDURE — 99204 OFFICE O/P NEW MOD 45 MIN: CPT | Performed by: INTERNAL MEDICINE

## 2025-04-29 RX ORDER — BUDESONIDE, GLYCOPYRROLATE, AND FORMOTEROL FUMARATE 160; 9; 4.8 UG/1; UG/1; UG/1
AEROSOL, METERED RESPIRATORY (INHALATION)
COMMUNITY
Start: 2025-01-07

## 2025-04-29 RX ORDER — TRAZODONE HYDROCHLORIDE 50 MG/1
TABLET ORAL
COMMUNITY
Start: 2025-04-02

## 2025-04-29 RX ORDER — AMIODARONE HYDROCHLORIDE 200 MG/1
200 TABLET ORAL DAILY
COMMUNITY
Start: 2025-03-05 | End: 2026-03-05

## 2025-04-29 RX ORDER — DILTIAZEM HYDROCHLORIDE 120 MG/1
120 CAPSULE, EXTENDED RELEASE ORAL DAILY
COMMUNITY
Start: 2025-03-05

## 2025-04-29 RX ORDER — OXYBUTYNIN CHLORIDE 5 MG/1
TABLET, EXTENDED RELEASE ORAL
COMMUNITY
Start: 2025-01-07

## 2025-04-29 RX ORDER — TESTOSTERONE CYPIONATE 1000 MG/10ML
INJECTION, SOLUTION INTRAMUSCULAR
COMMUNITY
Start: 2025-04-22

## 2025-04-29 RX ORDER — ALBUTEROL SULFATE 90 UG/1
INHALANT RESPIRATORY (INHALATION)
COMMUNITY
Start: 2025-01-06

## 2025-04-29 RX ORDER — EMPAGLIFLOZIN 10 MG/1
10 TABLET, FILM COATED ORAL DAILY
COMMUNITY
Start: 2025-03-05

## 2025-04-29 NOTE — PROGRESS NOTES
New Sleep Patient Office Visit      Patient Name: Benoit Pham    Referring Physician: Mitul Lopez MD    Chief Complaint:    Chief Complaint   Patient presents with    Sleeping Problem       History of Present Illness: Benoit Pham is a 79 y.o. male who is here today to establish care with Sleep Medicine.     79-year-old male with atrial fibrillation status post ablation and pacemaker, arthritis, asthma, asthma with COPD, hypertension, dyslipidemia, TIANA diagnosed in the past was on CPAP and BiPAP therapy but not anything currently referred here for establishing care.  Patient states that his wife complains about his snoring, witnessed apneas and nonrestorative sleep with excessive daytime fatigue and sleepiness.  This has been going on for a while. He was diagnosed with sleep apnea in 2010.  He had AHI of 40.9 at that time.  Patient subsequently was placed on CPAP which he stayed on for long time but then eventually was switched over to BiPAP and he does not recall the details.  Only thing he brought in was download from 2021 he used his machine last and looks like he was on CPAP machine and his compliance was excellent at 85.7%.  His AHI was elevated but he was on fixed CPAP pressure of 11.  Patient states that after the recall occurred around that time and COVID was going on he stopped using the machine altogether.  He continues to struggle with fatigue and sleepiness and poor quality sleep.  Does wake up 3-4 times at night sometimes to use the restroom some time for unclear reasons.  Does sweat at night.  Ends up taking naps on a daily basis for 30 to 45 minutes.  Does not think that naps are refreshing either.  Denies any restless leg symptoms.  Denies any REM behavior disorder.  Denies any other parasomnias.  Denies any recent weight gain or weight loss either.    Patient quit smoking in 1975.  Does drink alcohol 2-3 times a week with 1 to 2 glasses of wine.  Drinks 10 cups of  coffee a day and 2 cups of tea and states that he likes the taste of coffee so he drinks at that much but does not use it to keep himself awake however.    Further sleep history is as below.    Barlow Scale: 17/24    Estimated average amount of sleep per night: 6-7  Number of times  he wakes up at night: 3-4  Difficulty falling back asleep: no  It usually takes 15 minutes to go to sleep.  He feels sleepy upon waking up: yes  Rotating or night shift work: no    Drowsiness/Sleepiness:  He exhibits the following:  excessive daytime sleepiness  excessive daytime fatigue  takes scheduled naps during the day  sleepy even when sleep time is increased    Snoring/Breathing:  He exhibits the following:  loud snoring  snores in all sleep positions  awoken with dry mouth  quits breathing at night    Reflux:  He describes the following:  NA    Narcolepsy:  He exhibits the following:  none    RLS/PLMs:  He describes the following:  none    Insomnia:  He describes the following:  frequent awakenings  restless sleep    Parasomnia:  He exhibits the following:  excessive sweating while sleeping    Weight:  Weight change in the last year:  Stable    Subjective      Review of Systems:   Review of Systems   Constitutional:  Positive for fatigue.   HENT: Negative.     Respiratory: Negative.     Cardiovascular: Negative.    Gastrointestinal: Negative.    Endocrine: Negative.    Musculoskeletal:  Positive for arthralgias.   Skin: Negative.    Neurological: Negative.    Hematological: Negative.    Psychiatric/Behavioral:  Positive for sleep disturbance.    All other systems reviewed and are negative.      Past Medical History:   Past Medical History:   Diagnosis Date    A-fib     Anxiety     Arrhythmia 2000    pace maker    Arthritis     Asthma     BPH (benign prostatic hyperplasia)     Cardiomyopathy, dilated 04/06/2022    COPD (chronic obstructive pulmonary disease)     Depression     Diverticulosis     DJD (degenerative joint disease)      Hiatal hernia with GERD     Hyperlipidemia     Hypertension     Lethargy     TIANA on CPAP     BIPAP    Pacemaker     SSS (sick sinus syndrome)     Suicide attempt     SVT (supraventricular tachycardia)     Tobacco use        Past Surgical History:   Past Surgical History:   Procedure Laterality Date    CARDIAC CATHETERIZATION      CARDIAC ELECTROPHYSIOLOGY PROCEDURE N/A 2021    Procedure: Ablation atrial fibrillation- within the next 4 weeks;  Surgeon: Varun Reyna MD;  Location: [facility];  Service: Cardiovascular;  Laterality: N/A;    CARDIAC ELECTROPHYSIOLOGY PROCEDURE N/A 2022    Procedure: DDD PPM Gen Chg ([facility]), hold Xarelto 1 day;  Surgeon: Varun Reyna MD;  Location: [facility];  Service: Cardiology;  Laterality: N/A;    CARDIAC ELECTROPHYSIOLOGY STUDY AND ABLATION      CATARACT EXTRACTION WITH INTRAOCULAR LENS IMPLANT      INSERT / REPLACE / REMOVE PACEMAKER  2013    PACEMAKER IMPLANTATION      BSC    TONSILLECTOMY      TOTAL HIP ARTHROPLASTY Right        Family History:   Family History   Problem Relation Age of Onset    Asthma Mother                Social History:   Social History     Socioeconomic History    Marital status:    Tobacco Use    Smoking status: Former     Current packs/day: 0.00     Average packs/day: 1 pack/day for 19.0 years (19.0 ttl pk-yrs)     Types: Cigarettes     Start date: 1955     Quit date: 1975     Years since quittin.3    Smokeless tobacco: Never    Tobacco comments:     quit and started running   Vaping Use    Vaping status: Never Used   Substance and Sexual Activity    Alcohol use: Yes     Alcohol/week: 4.0 standard drinks of alcohol     Types: 4 Glasses of wine per week     Comment: evening relax on my dock    Drug use: Never    Sexual activity: Yes     Partners: Female     Birth control/protection: None       Medications:     Current Outpatient Medications:     acetaminophen  (TYLENOL) 500 MG tablet, Take 1 tablet by mouth Every 6 (Six) Hours As Needed for Mild Pain., Disp: , Rfl:     albuterol sulfate  (90 Base) MCG/ACT inhaler, , Disp: , Rfl:     amiodarone (PACERONE) 200 MG tablet, Take 1 tablet by mouth Daily., Disp: , Rfl:     Breztri Aerosphere 160-9-4.8 MCG/ACT aerosol inhaler, , Disp: , Rfl:     cholecalciferol (VITAMIN D3) 25 MCG (1000 UT) tablet, Take 1 tablet by mouth Daily., Disp: , Rfl:     cycloSPORINE (RESTASIS) 0.05 % ophthalmic emulsion, Administer 1 drop to both eyes 2 (Two) Times a Day., Disp: , Rfl:     dilTIAZem XR (DILACOR XR) 120 MG 24 hr capsule, Take 1 capsule by mouth Daily., Disp: , Rfl:     glucosamine-chondroitin 500-400 MG capsule capsule, Take 1 capsule by mouth 2 (Two) Times a Day With Meals., Disp: , Rfl:     ibuprofen (ADVIL,MOTRIN) 600 MG tablet, As Needed., Disp: , Rfl:     Jardiance 10 MG tablet tablet, Take 1 tablet by mouth Daily., Disp: , Rfl:     Magnesium Cl-Calcium Carbonate (SLOW-MAG PO), Take 1 tablet by mouth 2 (two) times a day., Disp: , Rfl:     multivitamin with minerals tablet tablet, Take 1 tablet by mouth Daily., Disp: , Rfl:     nitroglycerin (NITROSTAT) 0.4 MG SL tablet, Place 1 tablet under the tongue Every 5 (Five) Minutes As Needed for Chest Pain. Take no more than 3 doses in 15 minutes., Disp: , Rfl:     oxybutynin XL (DITROPAN-XL) 5 MG 24 hr tablet, , Disp: , Rfl:     polyethyl glycol-propyl glycol (SYSTANE) 0.4-0.3 % solution ophthalmic solution (artificial tears), Every 1 (One) Hour As Needed., Disp: , Rfl:     rivaroxaban (XARELTO) 20 MG tablet, Take 1 tablet by mouth Daily With Dinner. First dose on Thursday 5/12/2022, Disp: 30 tablet, Rfl: 11    rosuvastatin (CRESTOR) 10 MG tablet, Take 1 tablet by mouth Daily., Disp: , Rfl:     tadalafil (CIALIS) 10 MG tablet, As Needed., Disp: , Rfl:     testosterone cypionate (DEPO-TESTOSTERONE) 100 MG/ML solution injection, , Disp: , Rfl:     traZODone (DESYREL) 50 MG tablet, ,  "Disp: , Rfl:     vitamin B-12 (CYANOCOBALAMIN) 1000 MCG tablet, Take 1 tablet by mouth Daily., Disp: , Rfl:     Allergies:   Allergies   Allergen Reactions    Penicillins Hives    Nuts Mental Status Change     WALNUTS - BECOMES VERY SLEEPY       Objective     Physical Exam:  Vital Signs:   Vitals:    04/29/25 1020   BP: 128/80   Pulse: 61   Temp: 97.9 °F (36.6 °C)   TempSrc: Infrared   SpO2: 96%  Comment: room air at rest   Weight: 90.3 kg (199 lb)   Height: 165.1 cm (65\")     Body mass index is 33.12 kg/m².    Physical Exam  Vitals and nursing note reviewed.   Constitutional:       General: He is not in acute distress.     Appearance: He is well-developed. He is not diaphoretic.   HENT:      Head: Normocephalic and atraumatic.      Comments: Mallampati 3 airway     Nose: Nose normal.   Eyes:      General:         Right eye: No discharge.         Left eye: No discharge.      Pupils: Pupils are equal, round, and reactive to light.   Neck:      Thyroid: No thyromegaly.      Trachea: No tracheal deviation.   Cardiovascular:      Rate and Rhythm: Normal rate and regular rhythm.      Heart sounds: Normal heart sounds. No murmur heard.     No friction rub. No gallop.   Pulmonary:      Effort: Pulmonary effort is normal. No respiratory distress.      Breath sounds: Normal breath sounds. No wheezing or rales.   Musculoskeletal:         General: No tenderness.      Cervical back: Neck supple.      Right lower leg: No edema.      Left lower leg: No edema.   Neurological:      Mental Status: He is alert and oriented to person, place, and time.   Psychiatric:         Behavior: Behavior normal.         Thought Content: Thought content normal.         Judgment: Judgment normal.         Results Review:   I reviewed the patient's new clinical results.  Lab Results   Component Value Date    TSH 3.690 03/29/2021     Outside data reviewed and showed AHI 40.9 and the diagnostic study in 2010.  CPAP download 3 years ago showed not fully " treated sleep apnea on CPAP of 11.  Compliance was acceptable    Assessment / Plan      Assessment:   Problem List Items Addressed This Visit    None  Visit Diagnoses         TIANA (obstructive sleep apnea)    -  Primary    Relevant Orders    Polysomnography 4 or More Parameters      Hypersomnolence          Obesity (BMI 30-39.9)                  Plan:   1.  Patient presented with complaints of snoring, excessive daytime sleepiness and significant sleep fragmentation and poor quality sleep.  Has previous diagnosis of sleep apnea and has not been treated with CPAP at this time. All this put him at high risk of sleep disordered breathing.  Discussed at length about pathophysiology of sleep apnea.  Discussed side effects of untreated sleep apnea including poor quality sleep, cardiovascular, neurologic and metabolic side effects also discussed.  Patient is interested in getting his sleep apnea treated again.  We discussed that we could proceed with in-lab polysomnogram in a split-night format to redefine presence and severity of sleep apnea and also to see if you will benefit from CPAP or BiPAP.  He is agreeable with this plan.  We will go ahead and set him up for split-night study and follow-up closely after to discuss treatment options.     2.  If found to have significant sleep apnea available treatment options discussed including CPAP therapy, oral appliance, surgical options.  Weight loss as a treatment option for sleep apnea also discussed and counseled.  Patient is amenable to proceeding with CPAP for treatment of sleep apnea as needed.     3.  Increasing activity advised.      4.  Patient was having issues with insomnia but now doing better on trazodone.  Denies any side effects, continue same.  He does take over-the-counter sleep aids at times and I have counseled against that due to risk of side effects in his age group especially dryness of the mouth, urinary retention etc.    Thank you for allowing me to  participate in care of this patient.  Will follow him closely.      Follow Up:   After PSG    Discussed plan of care in detail with patient today. Patient verbally understands and agrees. I spent 48 minutes on this date of service. This time includes time spent by me in the following activities:preparing for the visit, reviewing tests, obtaining and/or reviewing a separately obtained history, performing a medically appropriate examination, counseling the patient/family, ordering tests, or procedures, and/or documenting information in the medical record. This time excludes other separate billable services such as interpretation of tests or procedures, if applicable.        Yamil Barrett MD  Pulmonary Critical Care and Sleep Medicine

## 2025-05-18 ENCOUNTER — HOSPITAL ENCOUNTER (OUTPATIENT)
Dept: SLEEP MEDICINE | Facility: HOSPITAL | Age: 80
Discharge: HOME | End: 2025-05-18
Admitting: INTERNAL MEDICINE
Payer: MEDICARE

## 2025-05-18 VITALS
HEART RATE: 96 BPM | BODY MASS INDEX: 32.49 KG/M2 | SYSTOLIC BLOOD PRESSURE: 133 MMHG | WEIGHT: 195 LBS | OXYGEN SATURATION: 91 % | HEIGHT: 65 IN | DIASTOLIC BLOOD PRESSURE: 90 MMHG

## 2025-05-18 DIAGNOSIS — G47.33 OSA (OBSTRUCTIVE SLEEP APNEA): ICD-10-CM

## 2025-05-18 PROCEDURE — 95811 POLYSOM 6/>YRS CPAP 4/> PARM: CPT

## 2025-05-27 DIAGNOSIS — G47.33 OSA (OBSTRUCTIVE SLEEP APNEA): Primary | ICD-10-CM

## 2025-05-28 ENCOUNTER — TELEPHONE (OUTPATIENT)
Dept: SLEEP MEDICINE | Facility: CLINIC | Age: 80
End: 2025-05-28
Payer: MEDICARE

## 2025-05-28 NOTE — TELEPHONE ENCOUNTER
Spoke with patient over the phone regarding sleep study results, patient stated understanding and is agreeable to pap therapy. [facility] [place] [facility]/28/25 [facility]

## (undated) DEVICE — ST EXT IV SMARTSITE 2VLV SP M LL 5ML IV1

## (undated) DEVICE — Device: Brand: SMARTABLATE

## (undated) DEVICE — Device: Brand: PENTARAY NAV

## (undated) DEVICE — SET PRIMARY GRVTY 10DP MALE LL 104IN

## (undated) DEVICE — ADULT, W/LG. BACK PAD, RADIOTRANSPARENT ELEMENT AND LEAD WIRE: Brand: DEFIBRILLATION ELECTRODES

## (undated) DEVICE — Device: Brand: WEBSTER

## (undated) DEVICE — ST INF PRI SMRTSTE 20DRP 2VLV 24ML 117

## (undated) DEVICE — PLASMABLADE PS210-030S 3.0S LOCK: Brand: PLASMABLADE™

## (undated) DEVICE — Device: Brand: THERMOCOOL SMARTTOUCH SF

## (undated) DEVICE — LEX ELECTRO PHYSIOLOGY: Brand: MEDLINE INDUSTRIES, INC.

## (undated) DEVICE — CANN NASL CO2 DIVIDED A/

## (undated) DEVICE — Device: Brand: VIZIGO

## (undated) DEVICE — ST EXT IV LG BORE NDLESS FLTR LL 17IN

## (undated) DEVICE — ACQGUIDE MINI-S, 65CM - L2 WITH ACQCROSS QX: Brand: ACQGUIDE MINI-S

## (undated) DEVICE — SOL NACL 0.9PCT 1000ML

## (undated) DEVICE — INTRO SHEATH FAST/CATH LG/LUM 11F .038IN 12CM

## (undated) DEVICE — SKIN PREP TRAY W/CHG: Brand: MEDLINE INDUSTRIES, INC.

## (undated) DEVICE — Device: Brand: MEDEX

## (undated) DEVICE — RETR ESOPH ESOSURE W/TEMP/CONTRL NITNL STY 27F

## (undated) DEVICE — DRSNG SURESITE123 4X4.8IN

## (undated) DEVICE — Device: Brand: SOUNDSTAR

## (undated) DEVICE — SYS CLS VASC/VENI VASCADE MVP 6TO12F

## (undated) DEVICE — MEDI-VAC YANKAUER SUCTION HANDLE W/BULBOUS TIP: Brand: CARDINAL HEALTH

## (undated) DEVICE — CONTRL CONTRST CHMBRD W/TBG72IN REUS

## (undated) DEVICE — 3M™ STERI-STRIP™ REINFORCED ADHESIVE SKIN CLOSURES, R1547, 1/2 IN X 4 IN (12 MM X 100 MM), 6 STRIPS/ENVELOPE: Brand: 3M™ STERI-STRIP™

## (undated) DEVICE — ELECTRD RETRN/GRND MEGADYNE SGL/PLT W/CORD 9FT DISP

## (undated) DEVICE — TUBING, SUCTION, 1/4" X 10', STRAIGHT: Brand: MEDLINE

## (undated) DEVICE — Device: Brand: REFERENCE PATCH CARTO 3

## (undated) DEVICE — IRRIGATOR BULB ASEPTO 60CC STRL

## (undated) DEVICE — INTRO SHEATH ENGAGE W/50 GW .038 9F12

## (undated) DEVICE — STERILE (15.2 TAPERED TO 7.6 X 183CM) POLYETHYLENE ACCORDION-FOLDED COVER FOR USE WITH SIEMENS ACUNAV ULTRASOUND CATHETER FAMILY CONNECTOR: Brand: SWIFTLINK TRANSDUCER COVER

## (undated) DEVICE — LIMB HOLDER, WRIST/ANKLE: Brand: DEROYAL

## (undated) DEVICE — DECANT BG O JET

## (undated) DEVICE — DOME MONITORING W BONDED STPCK BIOTRANS2

## (undated) DEVICE — INTRO SHEATH ENGAGE W/50 GW .038 7F12

## (undated) DEVICE — SYRINGE,PISTON,IRRIGATION,60ML,STERILE: Brand: MEDLINE